# Patient Record
Sex: FEMALE | Race: WHITE | NOT HISPANIC OR LATINO | Employment: PART TIME | URBAN - METROPOLITAN AREA
[De-identification: names, ages, dates, MRNs, and addresses within clinical notes are randomized per-mention and may not be internally consistent; named-entity substitution may affect disease eponyms.]

---

## 2019-12-23 ENCOUNTER — TELEPHONE (OUTPATIENT)
Dept: OBSTETRICS AND GYNECOLOGY | Facility: CLINIC | Age: 29
End: 2019-12-23

## 2019-12-23 NOTE — TELEPHONE ENCOUNTER
----- Message from Zainab Velarde sent at 12/23/2019 10:31 AM CST -----  Contact: TAMI GRACIA  Who Called: TAMI GRACIA    Date of Positive Preg Test: 12/22/19 (5 test)    1st day of Last Menstrual Cycle: 11/20/2019 (4 days)    Preferred Physician: Unknown     List Any Difficulties: n/a     What Number to Call Back: [phone]    Can the clinic reply in [facility]: no

## 2019-12-28 ENCOUNTER — HOSPITAL ENCOUNTER (EMERGENCY)
Facility: OTHER | Age: 29
Discharge: HOME | End: 2019-12-28
Attending: EMERGENCY MEDICINE

## 2019-12-28 VITALS
WEIGHT: 173 LBS | RESPIRATION RATE: 16 BRPM | TEMPERATURE: 99 F | SYSTOLIC BLOOD PRESSURE: 119 MMHG | HEART RATE: 80 BPM | OXYGEN SATURATION: 100 % | HEIGHT: 63 IN | BODY MASS INDEX: 30.65 KG/M2 | DIASTOLIC BLOOD PRESSURE: 59 MMHG

## 2019-12-28 DIAGNOSIS — O36.80X0 PREGNANCY OF UNKNOWN ANATOMIC LOCATION: Primary | ICD-10-CM

## 2019-12-28 LAB
ABO + RH BLD: NORMAL
BASOPHILS # BLD AUTO: 0.05 K/UL (ref 0–0.2)
BASOPHILS NFR BLD: 0.4 % (ref 0–1.9)
BLD GP AB SCN CELLS X3 SERPL QL: NORMAL
DIFFERENTIAL METHOD: ABNORMAL
EOSINOPHIL # BLD AUTO: 0.1 K/UL (ref 0–0.5)
EOSINOPHIL NFR BLD: 1 % (ref 0–8)
ERYTHROCYTE [DISTWIDTH] IN BLOOD BY AUTOMATED COUNT: 12.8 % (ref 11.5–14.5)
HCT VFR BLD AUTO: 39.3 % (ref 37–48.5)
HGB BLD-MCNC: 13.2 G/DL (ref 12–16)
IMM GRANULOCYTES # BLD AUTO: 0.04 K/UL (ref 0–0.04)
IMM GRANULOCYTES NFR BLD AUTO: 0.3 % (ref 0–0.5)
LYMPHOCYTES # BLD AUTO: 3.4 K/UL (ref 1–4.8)
LYMPHOCYTES NFR BLD: 29.2 % (ref 18–48)
MCH RBC QN AUTO: 31.4 PG (ref 27–31)
MCHC RBC AUTO-ENTMCNC: 33.6 G/DL (ref 32–36)
MCV RBC AUTO: 93 FL (ref 82–98)
MONOCYTES # BLD AUTO: 0.8 K/UL (ref 0.3–1)
MONOCYTES NFR BLD: 6.7 % (ref 4–15)
NEUTROPHILS # BLD AUTO: 7.3 K/UL (ref 1.8–7.7)
NEUTROPHILS NFR BLD: 62.4 % (ref 38–73)
NRBC BLD-RTO: 0 /100 WBC
PLATELET # BLD AUTO: 277 K/UL (ref 150–350)
PMV BLD AUTO: 9.9 FL (ref 9.2–12.9)
RBC # BLD AUTO: 4.21 M/UL (ref 4–5.4)
WBC # BLD AUTO: 11.68 K/UL (ref 3.9–12.7)

## 2019-12-28 PROCEDURE — 63600175 PHARM REV CODE 636 W HCPCS: Performed by: EMERGENCY MEDICINE

## 2019-12-28 PROCEDURE — 99242 OFF/OP CONSLTJ NEW/EST SF 20: CPT | Mod: ,,, | Performed by: OBSTETRICS & GYNECOLOGY

## 2019-12-28 PROCEDURE — 86850 RBC ANTIBODY SCREEN: CPT

## 2019-12-28 PROCEDURE — 99242 PR OFFICE CONSULTATION,LEVEL II: ICD-10-PCS | Mod: ,,, | Performed by: OBSTETRICS & GYNECOLOGY

## 2019-12-28 PROCEDURE — 99284 EMERGENCY DEPT VISIT MOD MDM: CPT | Mod: 25

## 2019-12-28 PROCEDURE — 96365 THER/PROPH/DIAG IV INF INIT: CPT

## 2019-12-28 PROCEDURE — 85025 COMPLETE CBC W/AUTO DIFF WBC: CPT | Mod: 91

## 2019-12-28 RX ADMIN — PROMETHAZINE HYDROCHLORIDE 12.5 MG: 25 INJECTION INTRAMUSCULAR; INTRAVENOUS at 07:12

## 2019-12-29 NOTE — ASSESSMENT & PLAN NOTE
- Beta HCG - 629. Too early to definitively identify IUP per TVUS  - TVUS without identified IUP yet adnexal mass could represent ectopic pregnancy  - VSS  - No clinically significant lab abnormalities  - Patient appears clinically stable with reassuring physical exam  - Pregnancy of unknown viability. Will need B-HCG in 48 hours to assess viability of pregnancy  - Offered patient observation overnight but patient declined and adamantly desired discharge home  - Strict precautions given  - Patient placed in Beta Book  - Patient will have lab drawn and will be seen in Clinic on Monday for follow-up

## 2019-12-29 NOTE — SUBJECTIVE & OBJECTIVE
OB History   No data available     No past medical history on file.  No past surgical history on file.      (Not in a hospital admission)    Review of patient's allergies indicates:  No Known Allergies     Family History     None        Tobacco Use    Smoking status: Not on file   Substance and Sexual Activity    Alcohol use: Not on file    Drug use: Not on file    Sexual activity: Not on file     Review of Systems   Constitutional: Negative for chills and fever.   Eyes: Negative for visual disturbance.   Respiratory: Negative for shortness of breath.    Cardiovascular: Negative for chest pain.   Gastrointestinal: Negative for abdominal pain, diarrhea, nausea and vomiting.   Endocrine: Negative.    Genitourinary: Positive for vaginal bleeding. Negative for dysuria, hematuria and vaginal discharge.   Musculoskeletal: Negative for back pain.   Integumentary:  Negative for rash.   Neurological: Negative for headaches.   Psychiatric/Behavioral: Negative.       Objective:     Vital Signs (Most Recent):  Temp: 98.9 °F (37.2 °C) (12/28/19 1750)  Pulse: 85 (12/28/19 1750)  Resp: 16 (12/28/19 1750)  BP: (!) 140/91 (12/28/19 1750)  SpO2: 100 % (12/28/19 1750) Vital Signs (24h Range):  Temp:  [98.9 °F (37.2 °C)-99 °F (37.2 °C)] 98.9 °F (37.2 °C)  Pulse:  [73-85] 85  Resp:  [16-20] 16  SpO2:  [99 %-100 %] 100 %  BP: (122-140)/(44-91) 140/91     Weight: 78.5 kg (173 lb)  Body mass index is 31.14 kg/m².    No LMP recorded.    Physical Exam:   Constitutional: She is oriented to person, place, and time. She appears well-developed and well-nourished. No distress.    HENT:   Head: Normocephalic and atraumatic.    Eyes: Conjunctivae are normal.    Neck: Neck supple.    Cardiovascular: Normal rate, regular rhythm and intact distal pulses.     Pulmonary/Chest: Effort normal. No respiratory distress.        Abdominal: Soft. She exhibits no distension and no abdominal incision. There is no tenderness. There is no rebound and no  guarding.   Benign abdominal exam     Genitourinary: Vagina normal.   Genitourinary Comments: SSE - Evidence of old blood in the vagina with no active bleeding observed. Cervix closed.    SVE - No palpable adnexal masses. NO CMT.            Musculoskeletal: Normal range of motion and moves all extremeties.       Neurological: She is alert and oriented to person, place, and time.    Skin: Skin is warm and dry. She is not diaphoretic.    Psychiatric: She has a normal mood and affect. Her behavior is normal. Judgment and thought content normal.       Laboratory:    Beta HC    CBC:   Recent Labs   Lab 19  1804   WBC 11.68   RBC 4.21   HGB 13.2   HCT 39.3      MCV 93   MCH 31.4*   MCHC 33.6     CMP:   Recent Labs   Lab 19  1142   GLU 79   CALCIUM 8.8   ALBUMIN 4.2   PROT 7.5      K 3.7   CO2 24      BUN 11   CREATININE 0.5   ALKPHOS 54   ALT 13*   AST 21   BILITOT 0.8     I have personallly reviewed all pertinent lab results from the last 24 hours.    Diagnostic Results:  TVUS:     FINDINGS:  The uterus is midline and anteverted and measures 8.2 cm in greatest longitudinal dimension.    The endometrium measures 2 cm in greatest thickness.  There is a 0.17 cm cystic abnormality in the endometrium in the midbody of the uterus.    The myometrium is homogeneous in echogenicity.    The left ovary measures 2.98 x 1.35 x 2.31 cm in greatest dimension and there is a 1.4 cm cyst involving the left ovary.  There is normal color flow in the left ovary.    The right ovary measures 3.94 x 2.77 x 2.80 cm in greatest dimension.  There is a hypoechoic abnormality in the right ovary measuring 2.2 x 1.8 x 2.0 cm in greatest dimension with increased color flow surrounding this mass and a small ring-like structure measuring 0.49 cm in greatest dimension associated with this mass.    There is a mild amount of free fluid in the pelvis.      Impression       Right adnexal mass measuring 2.2 cm in greatest  dimension with increased color flow surrounding the mass and a ring-like structure associated with the mass.  This could represent an ectopic pregnancy.    Thickening of the endometrium.    Mild amount of free fluid in the pelvis.    A preliminary report of this examination was provided by virtual Radiology at the time of the performance of the study.

## 2019-12-29 NOTE — HPI
Ms. Adams is a 29-year-old female who presented to the ED at [facility] with complaints of vaginal bleeding.  According to the patient she has recently had positive pregnancy tests and would be dated at 5 weeks and 3 days based off of a last menstrual period.  Of note, the patient states that her periods are regular.  The patient states that yesterday morning she noted what she describes as a pink discharge coming from the vagina.  Her symptoms persisted throughout the course of the day and into the evening.  She states that she awoke this morning to find a small amount of brighter red blood and decided to present to the ED.  In the ED the patient's vital signs were found to be stable in her beta HCG was roughly 600.  A transvaginal ultrasound failed to identify an intrauterine pregnancy and was significant for a right adnexal mass.  Given concern for an ectopic pregnancy the patient was transported to [facility] for further evaluation by an OBGYN.  Upon arrival at [facility] the patient confirms the above story inc complains of a subtle increase in her vaginal bleeding since receiving her transvaginal ultrasound.  Otherwise, the patient denies any other significant complaints.    Of note this is a desired pregnancy.  The patient denies any risk factors for ectopic pregnancy including prior pelvic infections or abdominal surgeries.  The patient has not seen an OBGYN but has plans to establish care in the coming weeks.

## 2019-12-29 NOTE — CONSULTS
[facility]  Obstetrics & Gynecology  Consult Note    Patient Name: Bonnie Adams  MRN: [id number]  Admission Date: 12/28/2019  Hospital Length of Stay: 0 days  Code Status: No Order  Primary Care Provider: Primary Doctor No  Principal Problem: Pregnancy of unknown anatomic location    Consults  Subjective:     Chief Complaint: Vaginal Bleeding    History of Present Illness:  Ms. Adams is a 29-year-old female who presented to the ED at [facility] with complaints of vaginal bleeding.  According to the patient she has recently had positive pregnancy tests and would be dated at 5 weeks and 3 days based off of a last menstrual period.  Of note, the patient states that her periods are regular.  The patient states that yesterday morning she noted what she describes as a pink discharge coming from the vagina.  Her symptoms persisted throughout the course of the day and into the evening.  She states that she awoke this morning to find a small amount of brighter red blood and decided to present to the ED.  In the ED the patient's vital signs were found to be stable in her beta HCG was roughly 600.  A transvaginal ultrasound failed to identify an intrauterine pregnancy and was significant for a right adnexal mass.  Given concern for an ectopic pregnancy the patient was transported to [facility] for further evaluation by an OBGYN.  Upon arrival at [facility] the patient confirms the above story inc complains of a subtle increase in her vaginal bleeding since receiving her transvaginal ultrasound.  Otherwise, the patient denies any other significant complaints.    Of note this is a desired pregnancy.  The patient denies any risk factors for ectopic pregnancy including prior pelvic infections or abdominal surgeries.  The patient has not seen an OBGYN but has plans to establish care in the coming weeks.        OB History   No data available     No past medical history on file.  No  past surgical history on file.      (Not in a hospital admission)    Review of patient's allergies indicates:  No Known Allergies     Family History     None        Tobacco Use    Smoking status: Not on file   Substance and Sexual Activity    Alcohol use: Not on file    Drug use: Not on file    Sexual activity: Not on file     Review of Systems   Constitutional: Negative for chills and fever.   Eyes: Negative for visual disturbance.   Respiratory: Negative for shortness of breath.    Cardiovascular: Negative for chest pain.   Gastrointestinal: Negative for abdominal pain, diarrhea, nausea and vomiting.   Endocrine: Negative.    Genitourinary: Positive for vaginal bleeding. Negative for dysuria, hematuria and vaginal discharge.   Musculoskeletal: Negative for back pain.   Integumentary:  Negative for rash.   Neurological: Negative for headaches.   Psychiatric/Behavioral: Negative.       Objective:     Vital Signs (Most Recent):  Temp: 98.9 °F (37.2 °C) (12/28/19 1750)  Pulse: 85 (12/28/19 1750)  Resp: 16 (12/28/19 1750)  BP: (!) 140/91 (12/28/19 1750)  SpO2: 100 % (12/28/19 1750) Vital Signs (24h Range):  Temp:  [98.9 °F (37.2 °C)-99 °F (37.2 °C)] 98.9 °F (37.2 °C)  Pulse:  [73-85] 85  Resp:  [16-20] 16  SpO2:  [99 %-100 %] 100 %  BP: (122-140)/(44-91) 140/91     Weight: 78.5 kg (173 lb)  Body mass index is 31.14 kg/m².    No LMP recorded.    Physical Exam:   Constitutional: She is oriented to person, place, and time. She appears well-developed and well-nourished. No distress.    HENT:   Head: Normocephalic and atraumatic.    Eyes: Conjunctivae are normal.    Neck: Neck supple.    Cardiovascular: Normal rate, regular rhythm and intact distal pulses.     Pulmonary/Chest: Effort normal. No respiratory distress.        Abdominal: Soft. She exhibits no distension and no abdominal incision. There is no tenderness. There is no rebound and no guarding.   Benign abdominal exam     Genitourinary: Vagina normal.    Genitourinary Comments: SSE - Evidence of old blood in the vagina with no active bleeding observed. Cervix closed.    SVE - No palpable adnexal masses. NO CMT.            Musculoskeletal: Normal range of motion and moves all extremeties.       Neurological: She is alert and oriented to person, place, and time.    Skin: Skin is warm and dry. She is not diaphoretic.    Psychiatric: She has a normal mood and affect. Her behavior is normal. Judgment and thought content normal.       Laboratory:    Beta HC    CBC:   Recent Labs   Lab 19  1804   WBC 11.68   RBC 4.21   HGB 13.2   HCT 39.3      MCV 93   MCH 31.4*   MCHC 33.6     CMP:   Recent Labs   Lab 19  1142   GLU 79   CALCIUM 8.8   ALBUMIN 4.2   PROT 7.5      K 3.7   CO2 24      BUN 11   CREATININE 0.5   ALKPHOS 54   ALT 13*   AST 21   BILITOT 0.8     I have personallly reviewed all pertinent lab results from the last 24 hours.    Diagnostic Results:  TVUS:     FINDINGS:  The uterus is midline and anteverted and measures 8.2 cm in greatest longitudinal dimension.    The endometrium measures 2 cm in greatest thickness.  There is a 0.17 cm cystic abnormality in the endometrium in the midbody of the uterus.    The myometrium is homogeneous in echogenicity.    The left ovary measures 2.98 x 1.35 x 2.31 cm in greatest dimension and there is a 1.4 cm cyst involving the left ovary.  There is normal color flow in the left ovary.    The right ovary measures 3.94 x 2.77 x 2.80 cm in greatest dimension.  There is a hypoechoic abnormality in the right ovary measuring 2.2 x 1.8 x 2.0 cm in greatest dimension with increased color flow surrounding this mass and a small ring-like structure measuring 0.49 cm in greatest dimension associated with this mass.    There is a mild amount of free fluid in the pelvis.      Impression       Right adnexal mass measuring 2.2 cm in greatest dimension with increased color flow surrounding the mass and a  ring-like structure associated with the mass.  This could represent an ectopic pregnancy.    Thickening of the endometrium.    Mild amount of free fluid in the pelvis.    A preliminary report of this examination was provided by virtual Radiology at the time of the performance of the study.         Assessment/Plan:     * Pregnancy of unknown anatomic location  - Beta HCG - 629. Too early to definitively identify IUP per TVUS  - TVUS without identified IUP yet adnexal mass could represent ectopic pregnancy  - VSS  - No clinically significant lab abnormalities  - Patient appears clinically stable with reassuring physical exam  - Pregnancy of unknown viability. Will need B-HCG in 48 hours to assess viability of pregnancy  - Offered patient observation overnight but patient declined and adamantly desired discharge home  - Strict precautions given  - Patient placed in Beta Book  - Patient will have lab drawn and will be seen in Clinic on Monday for follow-up      Thank you for your consult. I will sign off. Please contact us if you have any additional questions.    Kalina Mcallister MD  Obstetrics & Gynecology  [facility]    I had lengthy conversation with patient and her partner. Given pregnancy of unknown location, vaginal bleeding and US findings - I recommended admission to hospital for observation and serial exams. She declines. I spoke with radiology regarding US report and US is equivocal. At this point, this is a much desired pregnancy and she strongly desires expectant management. Abdominal exam is benign and bleeding has slowed down. She needs serial labs and repeat US. Strict precautions reviewed for her to return to ED immediately with any abdominal pain, increase in bleeding. I would recommend that she not go to work tomorrow and have someone stay with her at the house. She voices understanding. HCG on Monday and she will see someone in our office in clinic.     Deborah Rosales,  MD  Obstetrics and Gynecology  [facility]

## 2019-12-29 NOTE — ED PROVIDER NOTES
Encounter Date: 2019       History     Chief Complaint   Patient presents with    transfer     ED to ED transfer from [facility] for OBGYN evaluation for ectopic pregnancy, accepting OB MD Rosales.      29-year-old female  approximately 5 weeks pregnant by dates presents emergency department as a transfer from the [facility] Emergency Department.  The patient was seen today due to light vaginal spotting earlier that has progressed to now vaginal bleeding approximately the flow of a normal menstrual period.  The patient denies any urinary symptoms.  She reports that she has pain along the right side of her abdomen that radiates to her back at this time.  She denies any fever or chills.  She has been hemodynamically stable. She is Rh positive.  Her ultrasound showed a mass in the right adnexa concerning for ectopic pregnancy.     The history is provided by the patient.     Review of patient's allergies indicates:  No Known Allergies  No past medical history on file.  No past surgical history on file.  No family history on file.  Social History     Tobacco Use    Smoking status: Not on file   Substance Use Topics    Alcohol use: Not on file    Drug use: Not on file     Review of Systems   Constitutional: Negative for fever.   HENT: Negative for sore throat.    Respiratory: Negative for shortness of breath.    Cardiovascular: Negative for chest pain.   Gastrointestinal: Positive for abdominal pain. Negative for nausea.   Genitourinary: Positive for vaginal bleeding. Negative for dysuria.   Musculoskeletal: Negative for back pain.   Skin: Negative for color change, rash and wound.   Neurological: Negative for weakness.   Hematological: Does not bruise/bleed easily.   All other systems reviewed and are negative.      Physical Exam     Initial Vitals [19 1750]   BP Pulse Resp Temp SpO2   (!) 140/91 85 16 98.9 °F (37.2 °C) 100 %      MAP       --         Physical Exam    Nursing note  and vitals reviewed.  Constitutional: She appears well-developed and well-nourished. No distress.   Patient is tearful   HENT:   Head: Normocephalic and atraumatic.   Right Ear: External ear normal.   Left Ear: External ear normal.   Eyes: Conjunctivae are normal. Right eye exhibits no discharge. Left eye exhibits no discharge. No scleral icterus.   Cardiovascular: Normal rate, regular rhythm and normal heart sounds. Exam reveals no gallop and no friction rub.    No murmur heard.  Pulmonary/Chest: Breath sounds normal. No stridor. No respiratory distress. She has no wheezes. She has no rhonchi. She has no rales.   Abdominal: Soft. Bowel sounds are normal. She exhibits no distension and no mass. There is no tenderness. There is no rebound and no guarding.   Musculoskeletal: She exhibits no edema.   Neurological: She is alert and oriented to person, place, and time. She has normal strength. No cranial nerve deficit or sensory deficit. GCS score is 15. GCS eye subscore is 4. GCS verbal subscore is 5. GCS motor subscore is 6.   Skin: Skin is warm and dry. Capillary refill takes less than 2 seconds.   Psychiatric: She has a normal mood and affect. Her behavior is normal. Judgment and thought content normal.         ED Course   Procedures  Labs Reviewed   CBC W/ AUTO DIFFERENTIAL - Abnormal; Notable for the following components:       Result Value    Mean Corpuscular Hemoglobin 31.4 (*)     All other components within normal limits   TYPE & SCREEN          Imaging Results    None          Medical Decision Making:   Initial Assessment:   29-year-old female transferred from [facility] for suspected ectopic pregnancy.  Transferred for ob/gyn services.  Contacted OB team on arrival, will eval patient in the ED.   Differential Diagnosis:   Ectopic pregnancy, threatened , spontaneous , implantation bleeding, vaginitis, UTI, rectal bleeding    Clinical Tests:   Lab Tests: Ordered and Reviewed  ED  Management:  This patient was discussed with Dr. Woodward at shift change including presentation, testing thus far and pending testing and treatment which includes OB/GYN eval.  Anticipated disposition is home.                                   Clinical Impression:       ICD-10-CM ICD-9-CM   1. Pregnancy of unknown anatomic location O36.80X0 V23.87                             Laura Bejarano MD  12/30/19 1008

## 2019-12-29 NOTE — HOSPITAL COURSE
12/28/2019 - patient transferred to [facility] from [facility] for evaluation of possible ectopic pregnancy.

## 2019-12-29 NOTE — DISCHARGE INSTRUCTIONS
Follow up with [facility] on Monday for repeat labs and OB office will get in touch with you after.

## 2019-12-30 ENCOUNTER — OFFICE VISIT (OUTPATIENT)
Dept: OBSTETRICS AND GYNECOLOGY | Facility: CLINIC | Age: 29
End: 2019-12-30

## 2019-12-30 ENCOUNTER — INITIAL PRENATAL (OUTPATIENT)
Dept: OBSTETRICS AND GYNECOLOGY | Facility: CLINIC | Age: 29
End: 2019-12-30

## 2019-12-30 ENCOUNTER — LAB VISIT (OUTPATIENT)
Dept: LAB | Facility: HOSPITAL | Age: 29
End: 2019-12-30
Attending: OBSTETRICS & GYNECOLOGY

## 2019-12-30 ENCOUNTER — TELEPHONE (OUTPATIENT)
Dept: OBSTETRICS AND GYNECOLOGY | Facility: CLINIC | Age: 29
End: 2019-12-30

## 2019-12-30 VITALS
BODY MASS INDEX: 30.74 KG/M2 | HEIGHT: 63 IN | DIASTOLIC BLOOD PRESSURE: 62 MMHG | WEIGHT: 173.5 LBS | SYSTOLIC BLOOD PRESSURE: 130 MMHG

## 2019-12-30 VITALS
WEIGHT: 173.5 LBS | HEIGHT: 63 IN | DIASTOLIC BLOOD PRESSURE: 62 MMHG | SYSTOLIC BLOOD PRESSURE: 130 MMHG | BODY MASS INDEX: 30.74 KG/M2

## 2019-12-30 DIAGNOSIS — O36.80X0 PREGNANCY OF UNKNOWN ANATOMIC LOCATION: Primary | ICD-10-CM

## 2019-12-30 DIAGNOSIS — O36.80X0 PREGNANCY OF UNKNOWN ANATOMIC LOCATION: ICD-10-CM

## 2019-12-30 LAB
C TRACH DNA SPEC QL NAA+PROBE: NOT DETECTED
HCG INTACT+B SERPL-ACNC: 115 MIU/ML
N GONORRHOEA DNA SPEC QL NAA+PROBE: NOT DETECTED
PROGEST SERPL-MCNC: 3.4 NG/ML

## 2019-12-30 PROCEDURE — 99499 NO LOS: ICD-10-PCS | Mod: S$PBB,,, | Performed by: NURSE PRACTITIONER

## 2019-12-30 PROCEDURE — 84144 ASSAY OF PROGESTERONE: CPT

## 2019-12-30 PROCEDURE — 99213 PR OFFICE/OUTPT VISIT, EST, LEVL III, 20-29 MIN: ICD-10-PCS | Mod: S$PBB,,, | Performed by: NURSE PRACTITIONER

## 2019-12-30 PROCEDURE — 87661 TRICHOMONAS VAGINALIS AMPLIF: CPT

## 2019-12-30 PROCEDURE — 36415 COLL VENOUS BLD VENIPUNCTURE: CPT | Mod: PN

## 2019-12-30 PROCEDURE — 99213 OFFICE O/P EST LOW 20 MIN: CPT | Mod: S$PBB,,, | Performed by: NURSE PRACTITIONER

## 2019-12-30 PROCEDURE — 99999 PR PBB SHADOW E&M-EST. PATIENT-LVL III: ICD-10-PCS | Mod: PBBFAC,,, | Performed by: NURSE PRACTITIONER

## 2019-12-30 PROCEDURE — 87491 CHLMYD TRACH DNA AMP PROBE: CPT

## 2019-12-30 PROCEDURE — 87481 CANDIDA DNA AMP PROBE: CPT | Mod: 59

## 2019-12-30 PROCEDURE — 99999 PR PBB SHADOW E&M-EST. PATIENT-LVL III: CPT | Mod: PBBFAC,,, | Performed by: NURSE PRACTITIONER

## 2019-12-30 PROCEDURE — 99499 UNLISTED E&M SERVICE: CPT | Mod: S$PBB,,, | Performed by: NURSE PRACTITIONER

## 2019-12-30 PROCEDURE — 99213 OFFICE O/P EST LOW 20 MIN: CPT | Mod: PBBFAC,PN | Performed by: NURSE PRACTITIONER

## 2019-12-30 PROCEDURE — 84702 CHORIONIC GONADOTROPIN TEST: CPT

## 2019-12-30 NOTE — TELEPHONE ENCOUNTER
----- Message from Deborah Rosales MD sent at 12/28/2019  8:27 PM CST -----  Hi,  This patient needs to be seen on Monday by someone. I am not sure who has an available slot open. I am post call. She has a pregnancy of unknown location and was seen in the emergency room. Can we add her on to a providers schedule for Monday?  Thanks  Deborah

## 2019-12-30 NOTE — LETTER
December 30, 2019      Deborah Rosales MD  [address] [address]           [facility] - Obstetrics and Gynecology  [address] [facility] [address]  Phone: [phone]  Fax: [phone]          Patient: Bonnie Adams   MR Number: [id number]   YOB: 1990   Date of Visit: 12/30/2019       Dear Dr. Deborah Rosales:    Thank you for referring Bonnie Adams to me for evaluation. Attached you will find relevant portions of my assessment and plan of care.    If you have questions, please do not hesitate to call me. I look forward to following Bonnie Adams along with you.    Sincerely,    Racquel Womack, KRISSY    Enclosure  CC:  No Recipients    If you would like to receive this communication electronically, please contact [email or web address] or [phone] to request more information on [facility] [employer] access.    For providers and/or their staff who would like to refer a patient to [facility], please contact us through our one-stop-shop provider referral line, [facility], at [phone].    If you feel you have received this communication in error or would no longer like to receive these types of communications, please e-mail [email or web address]

## 2019-12-31 ENCOUNTER — TELEPHONE (OUTPATIENT)
Dept: OBSTETRICS AND GYNECOLOGY | Facility: CLINIC | Age: 29
End: 2019-12-31

## 2019-12-31 DIAGNOSIS — O03.9 SPONTANEOUS PREGNANCY LOSS: Primary | ICD-10-CM

## 2019-12-31 LAB
BACTERIAL VAGINOSIS DNA: POSITIVE
CANDIDA GLABRATA DNA: NEGATIVE
CANDIDA KRUSEI DNA: NEGATIVE
CANDIDA RRNA VAG QL PROBE: NEGATIVE
T VAGINALIS RRNA GENITAL QL PROBE: POSITIVE

## 2019-12-31 NOTE — TELEPHONE ENCOUNTER
Tried calling Bonnie on mobile number to review HCG results. No answer, unable to leave .    Deborah Rosales MD  Obstetrics and Gynecology  [facility]

## 2019-12-31 NOTE — TELEPHONE ENCOUNTER
Was able to reach patient. Reviewed HCG results. Suspect chemical pregnancy vs SAB given decline in HCG and bleeding. Recommend following down to normal given PUL. Use condoms or abstinence until then. Repeat HCG in 1 week.    Deborah Rosales MD  Obstetrics and Gynecology  [facility]

## 2019-12-31 NOTE — PROGRESS NOTES
History & Physical  Gynecology      SUBJECTIVE:     Chief Complaint: Follow-up (ed visit)       History of Present Illness:  Pt presents for follow-up from ER visit on 19 for possible right ectopic pregnancy. Pt presented to the ER in [place] on 19 for +UPT, vaginal bleeding, and right sided pelvic pain. Pt was transferred to [facility] ER. US report is as follows:     FINDINGS:  The uterus is midline and anteverted and measures 8.2 cm in greatest longitudinal dimension.    The endometrium measures 2 cm in greatest thickness.  There is a 0.17 cm cystic abnormality in the endometrium in the midbody of the uterus.    The myometrium is homogeneous in echogenicity.    The left ovary measures 2.98 x 1.35 x 2.31 cm in greatest dimension and there is a 1.4 cm cyst involving the left ovary.  There is normal color flow in the left ovary.    The right ovary measures 3.94 x 2.77 x 2.80 cm in greatest dimension.  There is a hypoechoic abnormality in the right ovary measuring 2.2 x 1.8 x 2.0 cm in greatest dimension with increased color flow surrounding this mass and a small ring-like structure measuring 0.49 cm in greatest dimension associated with this mass.    There is a mild amount of free fluid in the pelvis.      Impression       Right adnexal mass measuring 2.2 cm in greatest dimension with increased color flow surrounding the mass and a ring-like structure associated with the mass.  This could represent an ectopic pregnancy.    Thickening of the endometrium.    Mild amount of free fluid in the pelvis.         Initial bhcg 629, Rh +    Pt reports she is no longer bleeding or having any pelvic pain      Review of patient's allergies indicates:  No Known Allergies    Past Medical History:   Diagnosis Date    Scoliosis      History reviewed. No pertinent surgical history.  OB History        2    Para        Term                AB        Living           SAB        TAB        Ectopic         Multiple        Live Births                   Family History   Problem Relation Age of Onset    Breast cancer Neg Hx     Colon cancer Neg Hx     Ovarian cancer Neg Hx      Social History     Tobacco Use    Smoking status: Not on file   Substance Use Topics    Alcohol use: Not on file    Drug use: Not on file       Current Outpatient Medications   Medication Sig    prenatal vit/iron fum/folic ac (PRENATAL 1+1 ORAL) Take by mouth.     No current facility-administered medications for this visit.      ROS:  GENERAL: Denies weight gain or weight loss. Feeling well overall.   SKIN: Denies rash or lesions.   HEAD: Denies head injury or headache.   NODES: Denies enlarged lymph nodes.   CHEST: Denies chest pain or shortness of breath.   CARDIOVASCULAR: Denies palpitations or left sided chest pain.   ABDOMEN: No abdominal pain, constipation, diarrhea, nausea, vomiting or rectal bleeding.   URINARY: No frequency, dysuria, hematuria, or burning on urination.  REPRODUCTIVE: See HPI.   BREASTS: The patient performs breast self-examination and denies pain, lumps, or nipple discharge.   HEMATOLOGIC: No easy bruisability or excessive bleeding.  MUSCULOSKELETAL: Denies joint pain or swelling.   NEUROLOGIC: Denies syncope or weakness.   PSYCHIATRIC: Denies depression, anxiety or mood swings.    Physical Exam:  APPEARANCE: Well nourished, well developed, in no acute distress.  AFFECT: WNL, alert and oriented x 3  SKIN: No acne or hirsutism  NECK: Neck symmetric without masses or thyromegaly  NODES: No inguinal, cervical, axillary, or femoral lymph node enlargement  CHEST: Good respiratory effect  ABDOMEN: Soft.  No tenderness or masses.  No hepatosplenomegaly.  No hernias.  BREASTS: Symmetrical, no skin changes or visible lesions.  No palpable masses, nipple discharge bilaterally.  PELVIC: Normal external genitalia without lesions.  Normal hair distribution.  Adequate perineal body, normal urethral meatus.  Vagina moist and well  rugated without lesions or discharge.  Cervix pink, without lesions, discharge or tenderness.  No significant cystocele or rectocele.  Bimanual exam shows uterus to be normal size, regular, mobile and nontender.  Adnexa without masses or tenderness.    EXTREMITIES: No edema.      ASSESSMENT:       ICD-10-CM ICD-9-CM    1. Pregnancy of unknown anatomic location O36.80X0 V23.87           Plan:      Discussed case with Dr. Watkins. As pt is no longer having any pain or bleeding and first bhcg was only 629, will repeat bhcg with progesterone today and repeat US in 1 week. Strict ectopic precautions discussed. Discussed with pt this could be an early intrauterine pregnancy, an ectopic pregnancy, or a spontaneous AB. All questions answered.     GCCT, affirm done today as they were not previously done.     F/u 1 week    Racquel Womack

## 2020-01-01 ENCOUNTER — HOSPITAL ENCOUNTER (EMERGENCY)
Facility: OTHER | Age: 30
Discharge: HOME | End: 2020-01-02
Attending: EMERGENCY MEDICINE

## 2020-01-01 DIAGNOSIS — O03.9 COMPLETE MISCARRIAGE: Primary | ICD-10-CM

## 2020-01-01 LAB
BASOPHILS # BLD AUTO: 0.03 K/UL (ref 0–0.2)
BASOPHILS NFR BLD: 0.2 % (ref 0–1.9)
DIFFERENTIAL METHOD: ABNORMAL
EOSINOPHIL # BLD AUTO: 0.1 K/UL (ref 0–0.5)
EOSINOPHIL NFR BLD: 1.1 % (ref 0–8)
ERYTHROCYTE [DISTWIDTH] IN BLOOD BY AUTOMATED COUNT: 12.8 % (ref 11.5–14.5)
EXTRA BLUE TOP RAINBOW: NORMAL
EXTRA GOLD TOP RAINBOW: NORMAL
EXTRA LAVENDER TOP RAINBOW: NORMAL
EXTRA RED TOP RAINBOW: NORMAL
HCT VFR BLD AUTO: 36 % (ref 37–48.5)
HGB BLD-MCNC: 11.9 G/DL (ref 12–16)
IMM GRANULOCYTES # BLD AUTO: 0.06 K/UL (ref 0–0.04)
IMM GRANULOCYTES NFR BLD AUTO: 0.5 % (ref 0–0.5)
LYMPHOCYTES # BLD AUTO: 2 K/UL (ref 1–4.8)
LYMPHOCYTES NFR BLD: 16.9 % (ref 18–48)
MCH RBC QN AUTO: 30.8 PG (ref 27–31)
MCHC RBC AUTO-ENTMCNC: 33.1 G/DL (ref 32–36)
MCV RBC AUTO: 93 FL (ref 82–98)
MONOCYTES # BLD AUTO: 0.9 K/UL (ref 0.3–1)
MONOCYTES NFR BLD: 7.3 % (ref 4–15)
NEUTROPHILS # BLD AUTO: 8.9 K/UL (ref 1.8–7.7)
NEUTROPHILS NFR BLD: 74 % (ref 38–73)
NRBC BLD-RTO: 0 /100 WBC
PLATELET # BLD AUTO: 224 K/UL (ref 150–350)
PMV BLD AUTO: 9.7 FL (ref 9.2–12.9)
RBC # BLD AUTO: 3.86 M/UL (ref 4–5.4)
WBC # BLD AUTO: 12.05 K/UL (ref 3.9–12.7)

## 2020-01-01 PROCEDURE — 63600175 PHARM REV CODE 636 W HCPCS: Performed by: EMERGENCY MEDICINE

## 2020-01-01 PROCEDURE — 85025 COMPLETE CBC W/AUTO DIFF WBC: CPT

## 2020-01-01 PROCEDURE — 96361 HYDRATE IV INFUSION ADD-ON: CPT

## 2020-01-01 PROCEDURE — 86900 BLOOD TYPING SEROLOGIC ABO: CPT

## 2020-01-01 PROCEDURE — 99284 EMERGENCY DEPT VISIT MOD MDM: CPT | Mod: 25

## 2020-01-01 PROCEDURE — 80053 COMPREHEN METABOLIC PANEL: CPT

## 2020-01-01 PROCEDURE — 84702 CHORIONIC GONADOTROPIN TEST: CPT

## 2020-01-01 PROCEDURE — 96360 HYDRATION IV INFUSION INIT: CPT

## 2020-01-01 RX ORDER — SODIUM CHLORIDE 9 MG/ML
125 INJECTION, SOLUTION INTRAVENOUS CONTINUOUS
Status: DISCONTINUED | OUTPATIENT
Start: 2020-01-01 | End: 2020-01-02

## 2020-01-01 RX ADMIN — SODIUM CHLORIDE 1000 ML: 0.9 INJECTION, SOLUTION INTRAVENOUS at 11:01

## 2020-01-02 VITALS
HEIGHT: 62 IN | BODY MASS INDEX: 31.83 KG/M2 | WEIGHT: 173 LBS | SYSTOLIC BLOOD PRESSURE: 103 MMHG | HEART RATE: 70 BPM | OXYGEN SATURATION: 100 % | TEMPERATURE: 98 F | DIASTOLIC BLOOD PRESSURE: 61 MMHG | RESPIRATION RATE: 18 BRPM

## 2020-01-02 DIAGNOSIS — B96.89 BACTERIAL VAGINOSIS: Primary | ICD-10-CM

## 2020-01-02 DIAGNOSIS — A59.9 TRICHIMONIASIS: ICD-10-CM

## 2020-01-02 DIAGNOSIS — N76.0 BACTERIAL VAGINOSIS: Primary | ICD-10-CM

## 2020-01-02 LAB
ABO + RH BLD: NORMAL
ALBUMIN SERPL BCP-MCNC: 3.7 G/DL (ref 3.5–5.2)
ALP SERPL-CCNC: 68 U/L (ref 55–135)
ALT SERPL W/O P-5'-P-CCNC: 24 U/L (ref 10–44)
ANION GAP SERPL CALC-SCNC: 9 MMOL/L (ref 8–16)
AST SERPL-CCNC: 27 U/L (ref 10–40)
BILIRUB SERPL-MCNC: 0.1 MG/DL (ref 0.1–1)
BLD GP AB SCN CELLS X3 SERPL QL: NORMAL
BUN SERPL-MCNC: 8 MG/DL (ref 6–20)
CALCIUM SERPL-MCNC: 8.5 MG/DL (ref 8.7–10.5)
CHLORIDE SERPL-SCNC: 110 MMOL/L (ref 95–110)
CO2 SERPL-SCNC: 21 MMOL/L (ref 23–29)
CREAT SERPL-MCNC: 0.6 MG/DL (ref 0.5–1.4)
EST. GFR  (AFRICAN AMERICAN): >60 ML/MIN/1.73 M^2
EST. GFR  (NON AFRICAN AMERICAN): >60 ML/MIN/1.73 M^2
GLUCOSE SERPL-MCNC: 93 MG/DL (ref 70–110)
HCG INTACT+B SERPL-ACNC: 26 MIU/ML
POTASSIUM SERPL-SCNC: 3.5 MMOL/L (ref 3.5–5.1)
PROT SERPL-MCNC: 6.5 G/DL (ref 6–8.4)
SODIUM SERPL-SCNC: 140 MMOL/L (ref 136–145)

## 2020-01-02 RX ORDER — ONDANSETRON 4 MG/1
4 TABLET, ORALLY DISINTEGRATING ORAL EVERY 6 HOURS PRN
Qty: 20 TABLET | Refills: 0 | Status: SHIPPED | OUTPATIENT
Start: 2020-01-02 | End: 2021-11-04 | Stop reason: CLARIF

## 2020-01-02 RX ORDER — ETODOLAC 400 MG/1
400 TABLET, FILM COATED ORAL 2 TIMES DAILY PRN
Qty: 20 TABLET | Refills: 0 | Status: SHIPPED | OUTPATIENT
Start: 2020-01-02 | End: 2021-11-04 | Stop reason: CLARIF

## 2020-01-02 RX ORDER — METRONIDAZOLE 500 MG/1
500 TABLET ORAL 2 TIMES DAILY
Qty: 14 TABLET | Refills: 0 | Status: SHIPPED | OUTPATIENT
Start: 2020-01-02 | End: 2020-01-09

## 2020-01-02 RX ORDER — PROMETHAZINE HYDROCHLORIDE 25 MG/1
25 TABLET ORAL EVERY 6 HOURS PRN
Qty: 25 TABLET | Refills: 0 | Status: SHIPPED | OUTPATIENT
Start: 2020-01-02 | End: 2021-11-04 | Stop reason: CLARIF

## 2020-01-02 NOTE — ED TRIAGE NOTES
"Pt states she was sent to [facility] from [place] for "tubal pregnancy" on 12/28/19.  Was then told she had a chemical pregnancy.  Pt states she was having vaginal bleeding that increased after having an ultrasound last Saturday, but it slowly lessened and then stopped.  Pt states she started having heavy bleeding and cramping again tonight at around 5:30 pm.  Reports she started feeling lightheaded and dizzy.  Passed a blood clot while using the restroom.  +suprapubic pain.  aao x 4.  Reports this is her first pregnancy.  Pt arrived by EMS and was given 100 mcg Fentanyl en route.  "

## 2020-01-02 NOTE — ED PROVIDER NOTES
"Encounter Date: 1/1/2020    SCRIBE #1 NOTE: I, Surekha Martinez, am scribing for, and in the presence of, Dr. Leonardo.       History     Chief Complaint   Patient presents with    Vaginal Bleeding     and pelvic pain, recent miscarriage      Time seen by provider: 11:24 PM    This is a 29 y.o. female who presents via EMS with complaint of vaginal bleeding with associated pelvic pain and abdominal pain like "contractions" since 5:30 PM today. She was evaluated for vagina bleeding last week and believed to have an ectopic pregnancy. She has been followed in OBGYN clinic since then. She reports vaginal bleeding stopped 3 days ago, then started again today. She was seen 2 days ago for evaluation and labs which resulted yesterday showing HCG level of 115. She states that she generally weak right before she passed a blood clot this evening, all of which resolved afterwards. She still felt pelvic pain after passing clot. She has gone through one panty-liner since bleeding recurred today. She states that she has been using the restroom often to wipe away blood, but does not feel as though it would run down her leg. She denies any current pain as she was administered Fentanyl by EMS. This was her first pregnancy. She did not undergo any fertility treatments for this pregnancy. Her LMP was November 24. She has no other complaints at the time.    The history is provided by the patient and the spouse.     Review of patient's allergies indicates:  No Known Allergies  Past Medical History:   Diagnosis Date    Scoliosis      History reviewed. No pertinent surgical history.  Family History   Problem Relation Age of Onset    Breast cancer Neg Hx     Colon cancer Neg Hx     Ovarian cancer Neg Hx      Social History     Tobacco Use    Smoking status: Current Every Day Smoker     Packs/day: 0.50     Types: Cigarettes   Substance Use Topics    Alcohol use: Yes     Comment: sometimes    Drug use: Never     Review of Systems " "  Constitutional: Negative for chills and fever.   HENT:        Positive for hearing change (resolved).   Respiratory: Negative for chest tightness and shortness of breath.    Cardiovascular: Negative for chest pain.   Gastrointestinal: Positive for abdominal pain (like "contractions").   Genitourinary: Positive for pelvic pain and vaginal bleeding.   Neurological: Positive for dizziness (resolved) and weakness (generalized, resolved).   All other systems reviewed and are negative.      Physical Exam     Initial Vitals [01/01/20 2254]   BP Pulse Resp Temp SpO2   (!) 98/59 80 18 98.3 °F (36.8 °C) 99 %      MAP       --         Physical Exam    Nursing note and vitals reviewed.  Constitutional: She appears well-developed and well-nourished. She is not diaphoretic. No distress.   HENT:   Head: Normocephalic and atraumatic.   Right Ear: External ear normal.   Left Ear: External ear normal.   Nose: Nose normal.   Eyes: Conjunctivae and EOM are normal. Pupils are equal, round, and reactive to light.   Neck: Normal range of motion. Neck supple. No tracheal deviation present. No JVD present.   Cardiovascular: Normal rate, regular rhythm, normal heart sounds and intact distal pulses. Exam reveals no gallop and no friction rub.    No murmur heard.  Pulmonary/Chest: Breath sounds normal. No respiratory distress. She has no wheezes. She has no rhonchi. She has no rales. She exhibits no tenderness.   Abdominal: Soft. Bowel sounds are normal. She exhibits no distension and no mass. There is no tenderness. There is no rebound and no guarding.   Musculoskeletal: Normal range of motion. She exhibits no edema or tenderness.   Neurological: She is alert and oriented to person, place, and time. She has normal strength.   Skin: Skin is warm and dry. Capillary refill takes less than 2 seconds. No rash noted. No erythema.   Psychiatric: She has a normal mood and affect. Thought content normal.         ED Course   Procedures  Labs Reviewed "   CBC W/ AUTO DIFFERENTIAL - Abnormal; Notable for the following components:       Result Value    RBC 3.86 (*)     Hemoglobin 11.9 (*)     Hematocrit 36.0 (*)     Gran # (ANC) 8.9 (*)     Immature Grans (Abs) 0.06 (*)     Gran% 74.0 (*)     Lymph% 16.9 (*)     All other components within normal limits   COMPREHENSIVE METABOLIC PANEL - Abnormal; Notable for the following components:    CO2 21 (*)     Calcium 8.5 (*)     All other components within normal limits   HCG, QUANTITATIVE, PREGNANCY   BLUE-TOP TUBE   LAVENDER-TOP TUBE   GOLD-TOP TUBE   RED-TOP TUBE   TYPE & SCREEN          Imaging Results          US OB Less Than 14 Wks with Transvag(xpd (Final result)  Result time 20 00:04:24    Final result by Nataliia Claros MD (20 00:04:24)                 Impression:      No intrauterine pregnancy or gestational sac visualized, technically pregnancy of unknown location.  Findings may relate to early pregnancy or spontaneous .  No adnexal abnormalities or significant free fluid seen to suggest ectopic pregnancy.  Recommend serial beta hCGs and repeat pelvic ultrasound as clinically warranted.    Note is made that recently visualized right adnexal mass was not seen on today's exam.      Electronically signed by: Nataliia Claros MD  Date:    2020  Time:    00:04             Narrative:    EXAMINATION:  US OB LESS THAN 14 WKS WITH TRANSVAGINAL (XPD)    CLINICAL HISTORY:  Vag Bleeding;    TECHNIQUE:  Transabdominal sonography of the pelvis was performed, followed by transvaginal sonography to better evaluate the uterus and ovaries.    COMPARISON:  2019.    FINDINGS:  The uterus measures 7.9 x 3.6 x 4.6 cm. Uterine parenchyma is heterogenous in echotexture with suspected posterior fibroid seen measuring 1.4 cm.  No intrauterine pregnancy or gestational sac seen.  Endometrium is normal in thickness measuring 7 mm.    The right ovary measures 2.3 x 3.1 x 1.4 cm. The left ovary measures 2.8 x 1.2  x 2.4 cm. Arterial and venous flow are preserved bilaterally.  Possible corpus luteum cyst measuring 1.2 x 0.4 x 1.5 cm is seen on the left.  No adnexal abnormalities are seen.  No significant free fluid is seen.                                 Medical Decision Making:   History:   Old Medical Records: I decided to obtain old medical records.  Old Records Summarized: other records and records from clinic visits.       <> Summary of Records: The patient was evaluated on December 28 at [facility] where US was concerning for possible right ectopic pregnancy. OBGYN wanted to admit for serial exams but the patient did not want to be admitted. She was evaluated again on the 30th by OBGYN - repeat US was concerning for ectopic pregnancy, but she was not having any vaginal bleeding or pain therefore the decision was made for observation.  Differential Diagnosis:   Ectopic pregnancy, threatened miscarriage, miscarriage, urinary tract infection, acute pyelonephritis, ovarian torsion, ovarian cyst rupture, PID, BV, TOA, , ureterolithiais, AAA rupture / dissection, diverticulitis, colitis, inflammatory bowel disease, gastroenteritis, gastritis, ulcer, cholecystitis, gallstones, pancreatitis, ileus, small bowel obstruction, appendicitis, constipation, intestinal gas pain, GERD, intestinal spasm,  intrabominal hemorrhage, intrabominal thrombus      Clinical Tests:   Lab Tests: Ordered and Reviewed  Radiological Study: Ordered and Reviewed  ED Management:  After taking into careful account the patient's historical factors, physical exam findings, empirical and objective data obtained on ED workup, the patient appears to be low risk for an emergent medical condition. I feel it is safe and appropriate at this time for the patient to be discharged for follow up and re-evaluation as detailed in the discharge instructions. I have discussed the specifics of the workup with the patient/guardian and the patient/guardian has  verbalized understanding of the details of the workup, the diagnosis, the treatment plan, and the need for outpatient follow-up.  Although the patient has no emergent etiology today this does not preclude the development of an emergent condition some in addition, I have advised the patient that they can return to the ED and/or activated EMS at any time with worsening of her symptoms, change of their symptoms, or with any other medical complaints.  Patient's/guardian understands the emergency department visit today was primarily to address immediate concerns and to rule out emergent causes of the symptoms that may require further workup and evaluation as an outpatient.  All questions addressed and patient's/guardian given discharge instructions and follow-up information.  Patient improved with treatment in the emergency department and is comfortable going home.  Educated the patient on warning signs and symptoms for which they must seek immediate medical attention.  I emphasized the importance of followup.  Patient understands that the emergency visit today is primarily to address immediate concerns and to rule out emergent cause of symptoms and that they may require further workup and evaluation as an outpatient. All questions addressed and patient given discharge instructions and followup information.               Scribe Attestation:   Scribe #1: I performed the above scribed service and the documentation accurately describes the services I performed. I attest to the accuracy of the note.    Attending Attestation:           Physician Attestation for Scribe:  Physician Attestation Statement for Scribe #1: I, Dr. Leonardo, reviewed documentation, as scribed by Surekha Martinez in my presence, and it is both accurate and complete.                 ED Course as of Jan 02 1353   Thu Jan 02, 2020   0035 Patient's blood pressure still 90s, however she did not receive the entire L fluid since she was at [facility], so will  reassess after she has gotten her L. H&H today 11.9/36 as compared to 12/28 when it was 13.2/39.3.  Ultrasound today shows cyst 1.2 cm versus 2.2 cm on the 30th and no ring-enhancing lesion this time.    [MA]   0118 Ambulating without any lightheadedness     [MA]   0223 Case discussed with OBGYN, reports reviewed findgins and chart with staff who has been following patient in clinic, ok to d/c to home and f/u for repeat beta hcg in one week    [MA]      ED Course User Index  [MA] Mario Leonardo MD                Clinical Impression:     1. Complete miscarriage                                Mario Leonardo MD  01/02/20 7782

## 2020-01-02 NOTE — ED NOTES
MD cheng pt ambulated with steady gait.  No assistance needed.  HR remained in the 80's.  Pt denies any dizziness or weakness.

## 2020-01-02 NOTE — ED NOTES
Pt ambulate down the baron with steady gait, on continuous monitoring. HR reading 88, pulse ox 100 percent on RA. Pt denies SOB, dizziness. Will notify provider.

## 2020-01-03 ENCOUNTER — TELEPHONE (OUTPATIENT)
Dept: OBSTETRICS AND GYNECOLOGY | Facility: CLINIC | Age: 30
End: 2020-01-03

## 2020-01-03 NOTE — TELEPHONE ENCOUNTER
Called patient to inform of results per Racquel Womack NP per orders. Left voice message to call office back.       Please call pt and inform her she is negative for chlamydia and gonorrhea but positive for BV and trichomonas. I have sent in a prescription for flagyl 500 mg BID x 7 days which will treat both infections. She will need to have her partner tested and treated if positive for trich and abstain from unprotected intercourse x 1 week after both are treated. She will need to RTC in 6 weeks for LAVONNE. Please advise her to avoid alcohol while taking flagyl. Thanks so much!     Racquel   Associated Results     Result Notes for Vaginosis Screen by DNA Probe     Notes recorded by Racquel Womack NP on 1/2/2020 at 12:01 PM CST  Please call pt and inform her she is negative for chlamydia and gonorrhea but positive for BV and trichomonas. I have sent in a prescription for flagyl 500 mg BID x 7 days which will treat both infections. She will need to have her partner tested and treated if positive for trich and abstain from unprotected intercourse x 1 week after both are treated. She will need to RTC in 6 weeks for LAVONNE. Please advise her to avoid alcohol while taking flagyl. Thanks so much!    Racquel  Contains abnormal data Vaginosis Screen by DNA Probe   Order: [id number]   Status:  Final result   Visible to patient:  No (Not Released) Dx:  Pregnancy of unknown anatomic location   Specimen Information: Cervicovaginal; Genital       Ref Range & Units 3d ago  Trichomonas vaginalis Negative PositiveAbnormal    Candida sp Negative Negative   Comment: Candida species group includes: Candida albicans, Candida tropicalis,   Candida parapsiolosis, Candida dubliniensis   Cyndi glabrata DNA Negative Negative   Candida krusei DNA Negative Negative   Bacterial vaginosis DNA Negative PositiveAbnormal    Resulting Agency  [facility]      Specimen Collected: 12/30/19 11:50 Last Resulted: 12/31/19 03:36       Lab Flowsheet       Order Details      View Encounter      Lab and Collection Details      Routing      Result History

## 2020-01-14 ENCOUNTER — TELEPHONE (OUTPATIENT)
Dept: OBSTETRICS AND GYNECOLOGY | Facility: HOSPITAL | Age: 30
End: 2020-01-14

## 2020-01-15 ENCOUNTER — TELEPHONE (OUTPATIENT)
Dept: OBSTETRICS AND GYNECOLOGY | Facility: CLINIC | Age: 30
End: 2020-01-15

## 2020-01-15 NOTE — TELEPHONE ENCOUNTER
Called pt on 1/6/20 to return call and f/u on treatment for BV and trich; pt did not answer. Left message to call back with call back number.

## 2020-01-24 ENCOUNTER — TELEPHONE (OUTPATIENT)
Dept: OBSTETRICS AND GYNECOLOGY | Facility: CLINIC | Age: 30
End: 2020-01-24

## 2020-01-24 NOTE — TELEPHONE ENCOUNTER
Called patient to follow-up on results from last visit. No answer. Left voice message to call office back.      Please call pt and inform her she is negative for chlamydia and gonorrhea but positive for BV and trichomonas. I have sent in a prescription for flagyl 500 mg BID x 7 days which will treat both infections. She will need to have her partner tested and treated if positive for trich and abstain from unprotected intercourse x 1 week after both are treated. She will need to RTC in 6 weeks for LAVONNE. Please advise her to avoid alcohol while taking flagyl. Thanks so much!     Racquel

## 2020-01-24 NOTE — TELEPHONE ENCOUNTER
----- Message from Mrage Brennan LPN sent at 1/2/2020  5:11 PM CST -----  Bonnie Adams  Female, 29 y.o., 1990  Phone:   [phone] (M)  PCP:   Primary Doctor No  Language:   English  Need Interp:   No  Allergies Last Reviewed:   01/02/20  Allergies:   No Known Allergies  Health Maintenance:   Due  Pregnancy Status:   Primary Ins.:   None  MRN:   [id number]  Pt Comm Pref:   Patient Portal, Mail  Next Appt:   None  My Sticky Note:     Specialty Comments:      Message   Received: Today   Message Contents   KRISSY Navarro Staff         Please call pt and inform her she is negative for chlamydia and gonorrhea but positive for BV and trichomonas. I have sent in a prescription for flagyl 500 mg BID x 7 days which will treat both infections. She will need to have her partner tested and treated if positive for trich and abstain from unprotected intercourse x 1 week after both are treated. She will need to RTC in 6 weeks for LAVONNE. Please advise her to avoid alcohol while taking flagyl. Thanks so much!     Racquel   Associated Results     Result Notes for Vaginosis Screen by DNA Probe     Notes recorded by Racquel Woamck NP on 1/2/2020 at 12:01 PM CST  Please call pt and inform her she is negative for chlamydia and gonorrhea but positive for BV and trichomonas. I have sent in a prescription for flagyl 500 mg BID x 7 days which will treat both infections. She will need to have her partner tested and treated if positive for trich and abstain from unprotected intercourse x 1 week after both are treated. She will need to RTC in 6 weeks for LAVONNE. Please advise her to avoid alcohol while taking flagyl. Thanks so much!    Racquel  Contains abnormal data Vaginosis Screen by DNA Probe   Order: [id number]   Status:  Final result   Visible to patient:  No (Not Released) Dx:  Pregnancy of unknown anatomic location   Specimen Information: Cervicovaginal; Genital       Ref Range &  Units 3d ago  Trichomonas vaginalis Negative PositiveAbnormal    Candida sp Negative Negative   Comment: Candida species group includes: Candida albicans, Candida tropicalis,   Candida parapsiolosis, Candida dubliniensis   Cyndi glabrata DNA Negative Negative   Candida krusei DNA Negative Negative   Bacterial vaginosis DNA Negative PositiveAbnormal    Resulting Agency  [facility]      Specimen Collected: 12/30/19 11:50 Last Resulted: 12/31/19 03:36       Lab Flowsheet      Order Details      View Encounter      Lab and Collection Details      Routing      Result History

## 2020-01-31 ENCOUNTER — TELEPHONE (OUTPATIENT)
Dept: OBSTETRICS AND GYNECOLOGY | Facility: HOSPITAL | Age: 30
End: 2020-01-31

## 2020-02-07 ENCOUNTER — DOCUMENTATION ONLY (OUTPATIENT)
Dept: OBSTETRICS AND GYNECOLOGY | Facility: HOSPITAL | Age: 30
End: 2020-02-07

## 2020-02-07 NOTE — PROGRESS NOTES
Patient has been called numerous times regarding importance of following up with weekly beta hcg levels. Letter sent to patient today asking to get labs drawn. Will remove patient from beta book if has not followed up in 1 week.

## 2020-02-07 NOTE — LETTER
February 7, 2020    Bonnie Deana Adams  [address] [place] [id number]                [address] [facility] [address]  Phone: [phone]  Fax: [phone] Dear Ms. Adams:    We have been trying to contact you regarding your lab results. It is very important that you come in for a repeat lab. We need to track your beta-hcg levels down until they are below 5. There is an order in for you to get your blood drawn at [facility]. The lab is open from 8-5 Monday-Friday. Please follow up as soon as possible. This will be your last notice.  Sincerely,        Rupert Curtis MD

## 2023-07-24 ENCOUNTER — TELEPHONE (OUTPATIENT)
Dept: OBSTETRICS AND GYNECOLOGY | Facility: CLINIC | Age: 33
End: 2023-07-24
Payer: MEDICAID

## 2023-07-24 NOTE — TELEPHONE ENCOUNTER
Returned pts call. Pt requested to move appt to later in the day for tomorrow. Informed pt we do not have that avail. Pt vu and stated she would keep appt    ----- Message from Dayami Jacob sent at 7/24/2023 10:41 AM CDT -----  Name of Who is Calling:TAMI GRACIA [[id number]]           What is the request in detail:  pt stated that she would like to speak with the office directly in regards to her questions/concerns about changing her appt time on tomorrow. PT stated she would like anything after 10 am.Please contact to further discuss and advise.          Can the clinic reply by [facility]:[phone]           What Number to Call Back if not in [facility]:[phone]

## 2023-12-16 ENCOUNTER — HOSPITAL ENCOUNTER (EMERGENCY)
Facility: OTHER | Age: 33
Discharge: HOME | End: 2023-12-16
Attending: STUDENT IN AN ORGANIZED HEALTH CARE EDUCATION/TRAINING PROGRAM | Admitting: STUDENT IN AN ORGANIZED HEALTH CARE EDUCATION/TRAINING PROGRAM
Payer: MEDICAID

## 2023-12-16 VITALS
HEART RATE: 95 BPM | RESPIRATION RATE: 18 BRPM | HEIGHT: 63 IN | OXYGEN SATURATION: 98 % | SYSTOLIC BLOOD PRESSURE: 130 MMHG | WEIGHT: 220 LBS | BODY MASS INDEX: 38.98 KG/M2 | DIASTOLIC BLOOD PRESSURE: 89 MMHG | TEMPERATURE: 99 F

## 2023-12-16 DIAGNOSIS — O00.90 ECTOPIC PREGNANCY, UNSPECIFIED LOCATION, UNSPECIFIED WHETHER INTRAUTERINE PREGNANCY PRESENT: Primary | ICD-10-CM

## 2023-12-16 DIAGNOSIS — O00.90 ECTOPIC PREGNANCY: ICD-10-CM

## 2023-12-16 LAB
ALBUMIN SERPL BCP-MCNC: 4.1 G/DL (ref 3.5–5.2)
ALP SERPL-CCNC: 151 U/L (ref 55–135)
ALT SERPL W/O P-5'-P-CCNC: 24 U/L (ref 10–44)
ANION GAP SERPL CALC-SCNC: 12 MMOL/L (ref 8–16)
AST SERPL-CCNC: 33 U/L (ref 10–40)
B-HCG UR QL: POSITIVE
BACTERIA #/AREA URNS HPF: ABNORMAL /HPF
BASOPHILS # BLD AUTO: 0.07 K/UL (ref 0–0.2)
BASOPHILS NFR BLD: 0.5 % (ref 0–1.9)
BILIRUB SERPL-MCNC: 0.2 MG/DL (ref 0.1–1)
BILIRUB UR QL STRIP: ABNORMAL
BUN SERPL-MCNC: 9 MG/DL (ref 6–20)
CALCIUM SERPL-MCNC: 9.6 MG/DL (ref 8.7–10.5)
CHLORIDE SERPL-SCNC: 105 MMOL/L (ref 95–110)
CLARITY UR: ABNORMAL
CO2 SERPL-SCNC: 21 MMOL/L (ref 23–29)
COLOR UR: YELLOW
CREAT SERPL-MCNC: 0.7 MG/DL (ref 0.5–1.4)
CTP QC/QA: YES
DIFFERENTIAL METHOD BLD: ABNORMAL
EOSINOPHIL # BLD AUTO: 0.1 K/UL (ref 0–0.5)
EOSINOPHIL NFR BLD: 0.5 % (ref 0–8)
ERYTHROCYTE [DISTWIDTH] IN BLOOD BY AUTOMATED COUNT: 13.2 % (ref 11.5–14.5)
EST. GFR  (NO RACE VARIABLE): >60 ML/MIN/1.73 M^2
GLUCOSE SERPL-MCNC: 93 MG/DL (ref 70–110)
GLUCOSE UR QL STRIP: NEGATIVE
HCG INTACT+B SERPL-ACNC: 1653 MIU/ML
HCT VFR BLD AUTO: 43.1 % (ref 37–48.5)
HGB BLD-MCNC: 14.8 G/DL (ref 12–16)
HGB UR QL STRIP: ABNORMAL
HYALINE CASTS #/AREA URNS LPF: 1 /LPF
IMM GRANULOCYTES # BLD AUTO: 0.06 K/UL (ref 0–0.04)
IMM GRANULOCYTES NFR BLD AUTO: 0.4 % (ref 0–0.5)
KETONES UR QL STRIP: ABNORMAL
LEUKOCYTE ESTERASE UR QL STRIP: NEGATIVE
LYMPHOCYTES # BLD AUTO: 2.8 K/UL (ref 1–4.8)
LYMPHOCYTES NFR BLD: 19.3 % (ref 18–48)
MCH RBC QN AUTO: 30.6 PG (ref 27–31)
MCHC RBC AUTO-ENTMCNC: 34.3 G/DL (ref 32–36)
MCV RBC AUTO: 89 FL (ref 82–98)
MICROSCOPIC COMMENT: ABNORMAL
MONOCYTES # BLD AUTO: 0.9 K/UL (ref 0.3–1)
MONOCYTES NFR BLD: 5.9 % (ref 4–15)
NEUTROPHILS # BLD AUTO: 10.7 K/UL (ref 1.8–7.7)
NEUTROPHILS NFR BLD: 73.4 % (ref 38–73)
NITRITE UR QL STRIP: NEGATIVE
NRBC BLD-RTO: 0 /100 WBC
PH UR STRIP: 6 [PH] (ref 5–8)
PLATELET # BLD AUTO: 385 K/UL (ref 150–450)
PMV BLD AUTO: 8.8 FL (ref 9.2–12.9)
POTASSIUM SERPL-SCNC: 3.9 MMOL/L (ref 3.5–5.1)
PROT SERPL-MCNC: 8.1 G/DL (ref 6–8.4)
PROT UR QL STRIP: ABNORMAL
RBC # BLD AUTO: 4.84 M/UL (ref 4–5.4)
RBC #/AREA URNS HPF: 60 /HPF (ref 0–4)
SODIUM SERPL-SCNC: 138 MMOL/L (ref 136–145)
SP GR UR STRIP: >=1.03 (ref 1–1.03)
SQUAMOUS #/AREA URNS HPF: 10 /HPF
URN SPEC COLLECT METH UR: ABNORMAL
UROBILINOGEN UR STRIP-ACNC: 1 EU/DL
WBC # BLD AUTO: 14.62 K/UL (ref 3.9–12.7)
WBC #/AREA URNS HPF: 2 /HPF (ref 0–5)

## 2023-12-16 PROCEDURE — 81000 URINALYSIS NONAUTO W/SCOPE: CPT | Performed by: EMERGENCY MEDICINE

## 2023-12-16 PROCEDURE — 80053 COMPREHEN METABOLIC PANEL: CPT | Performed by: NURSE PRACTITIONER

## 2023-12-16 PROCEDURE — 96360 HYDRATION IV INFUSION INIT: CPT

## 2023-12-16 PROCEDURE — 85025 COMPLETE CBC W/AUTO DIFF WBC: CPT | Performed by: NURSE PRACTITIONER

## 2023-12-16 PROCEDURE — 25000003 PHARM REV CODE 250: Performed by: NURSE PRACTITIONER

## 2023-12-16 PROCEDURE — 96401 CHEMO ANTI-NEOPL SQ/IM: CPT

## 2023-12-16 PROCEDURE — 25000003 PHARM REV CODE 250

## 2023-12-16 PROCEDURE — 99285 EMERGENCY DEPT VISIT HI MDM: CPT | Mod: ,,, | Performed by: STUDENT IN AN ORGANIZED HEALTH CARE EDUCATION/TRAINING PROGRAM

## 2023-12-16 PROCEDURE — 96372 THER/PROPH/DIAG INJ SC/IM: CPT | Mod: 59

## 2023-12-16 PROCEDURE — 81025 URINE PREGNANCY TEST: CPT | Performed by: EMERGENCY MEDICINE

## 2023-12-16 PROCEDURE — 99285 EMERGENCY DEPT VISIT HI MDM: CPT | Mod: 25

## 2023-12-16 PROCEDURE — 63600175 PHARM REV CODE 636 W HCPCS

## 2023-12-16 PROCEDURE — 84702 CHORIONIC GONADOTROPIN TEST: CPT | Performed by: NURSE PRACTITIONER

## 2023-12-16 RX ORDER — MEDROXYPROGESTERONE ACETATE 150 MG/ML
150 INJECTION, SUSPENSION INTRAMUSCULAR ONCE
Status: COMPLETED | OUTPATIENT
Start: 2023-12-16 | End: 2023-12-16

## 2023-12-16 RX ORDER — ONDANSETRON 8 MG/1
8 TABLET, ORALLY DISINTEGRATING ORAL EVERY 8 HOURS PRN
Status: DISCONTINUED | OUTPATIENT
Start: 2023-12-16 | End: 2023-12-17 | Stop reason: HOSPADM

## 2023-12-16 RX ORDER — METHOTREXATE 25 MG/ML
50 INJECTION INTRA-ARTERIAL; INTRAMUSCULAR; INTRATHECAL; INTRAVENOUS ONCE
Status: COMPLETED | OUTPATIENT
Start: 2023-12-16 | End: 2023-12-16

## 2023-12-16 RX ORDER — PROCHLORPERAZINE EDISYLATE 5 MG/ML
5 INJECTION INTRAMUSCULAR; INTRAVENOUS EVERY 6 HOURS PRN
Status: DISCONTINUED | OUTPATIENT
Start: 2023-12-16 | End: 2023-12-17 | Stop reason: HOSPADM

## 2023-12-16 RX ADMIN — SODIUM CHLORIDE 1000 ML: 0.9 INJECTION, SOLUTION INTRAVENOUS at 07:12

## 2023-12-16 RX ADMIN — MEDROXYPROGESTERONE ACETATE 150 MG: 150 INJECTION, SUSPENSION, EXTENDED RELEASE INTRAMUSCULAR at 10:12

## 2023-12-16 RX ADMIN — METHOTREXATE 105 MG: 25 SOLUTION INTRA-ARTERIAL; INTRAMUSCULAR; INTRATHECAL; INTRAVENOUS at 10:12

## 2023-12-16 NOTE — Clinical Note
Diagnosis: Ectopic pregnancy [690205]   Future Attending Provider: NAREN BANKS [[id number]]   Admitting Provider:: NAREN BANKS [[id number]]

## 2023-12-17 NOTE — ED TRIAGE NOTES
Pt presents to ED c/o vaginal bleeding onset 1 hr PTA. +Abdominal cramping. Pt reports minimal spotting 2 days ago. Denies heavy bleeding, nausea and vomiting. AAOx4, NAD noted.

## 2023-12-17 NOTE — FIRST PROVIDER EVALUATION
Emergency Department TeleTriage Encounter Note      CHIEF COMPLAINT    Chief Complaint   Patient presents with    Vaginal Bleeding     4-5 weeks pregnant, vaginal bleeding onset 1 hour ago. Reports minimal spotting a couple days ago. Pt denies heavy bleeding. Reports dark red blood without blood clots. Reports mild abd cramping.        VITAL SIGNS   Initial Vitals [12/16/23 1806]   BP Pulse Resp Temp SpO2   137/88 107 18 98.6 °F (37 °C) 100 %      MAP       --            ALLERGIES    Review of patient's allergies indicates:  No Known Allergies    PROVIDER TRIAGE NOTE  This is a teletriage evaluation of a 33 y.o. female presenting to the ED complaining of vaginal bleeding starting today. Pt is pregnant, has not had US to confirm IUP. LMP around 11/10.  States she is having pelvic cramping.      Alert, no distress.  Blood type O+ 12/28/19.     Initial orders will be placed and care will be transferred to an alternate provider when patient is roomed for a full evaluation. Any additional orders and the final disposition will be determined by that provider.         ORDERS  Labs Reviewed   POCT URINE PREGNANCY - Abnormal; Notable for the following components:       Result Value    POC Preg Test, Ur Positive (*)     All other components within normal limits   URINALYSIS, REFLEX TO URINE CULTURE       ED Orders (720h ago, onward)      Start Ordered     Status Ordering Provider    12/16/23 1830 12/16/23 1824  sodium chloride 0.9% bolus 1,000 mL 1,000 mL  ED 1 Time         Ordered SCOTTIE GUZMAN N.    12/16/23 1825 12/16/23 1824  Insert peripheral IV  Once         Ordered SCOTTIE GUZMAN N.    12/16/23 1825 12/16/23 1824  CBC W/ AUTO DIFFERENTIAL  STAT         Ordered SCOTTIE GUZMAN N.    12/16/23 1825 12/16/23 1824  Comp. Metabolic Panel  STAT         Ordered SCOTTIE GUZMAN N.    12/16/23 1825 12/16/23 1824  hCG, quantitative  STAT         Ordered SCOTTIE GUZMAN N.    12/16/23 1825  12/16/23 1824  Setup Pelvic Tray  Once         Ordered SCOTTIE GUZMAN N.    12/16/23 1825 12/16/23 1824  US OB <14 Wks TransAbd & TransVag, Single Gestation (XPD)  1 time imaging         Ordered SCOTTIE GUZMAN N.    12/16/23 1825 12/16/23 1824  Urinalysis, Reflex to Urine Culture Urine, Clean Catch  STAT         Ordered AMEENAYOGIRASHIPARDEEP SCOTTIE N.    12/16/23 1815 12/16/23 1814  POCT urine pregnancy  Once         Final result JANEY GRESHAM II    12/16/23 1815 12/16/23 1814  Urinalysis, Reflex to Urine Culture Urine, Clean Catch  STAT         In process JANEY GRESHAM II              Virtual Visit Note: The provider triage portion of this emergency department evaluation and documentation was performed via [employer], a HIPAA-compliant telemedicine application, in concert with a tele-presenter in the room. A face to face patient evaluation with one of my colleagues will occur once the patient is placed in an emergency department room.      DISCLAIMER: This note was prepared with [employer] voice recognition transcription software. Garbled syntax, mangled pronouns, and other bizarre constructions may be attributed to that software system.

## 2023-12-17 NOTE — CONSULTS
[facility] Emergency Dept  Obstetrics & Gynecology  Consult Note    Patient Name: Bonnie Adams  MRN: [id number]  Admission Date: 2023  Hospital Length of Stay: 0 days  Code Status: No Order  Primary Care Provider: [facility]  Principal Problem: <principal problem not specified>    Inpatient consult to Obstetrics  Consult performed by: Kay Woodruff MD  Consult ordered by: Juana Paulson PA-C        Subjective:     Chief Complaint: Ectopic Pregnancy    History of Present Illness: 33 y.o.  @ 5w1d based on LMP (11/10/2023) who presented to ED due to reports of abdominal cramping. Patient states she found out she was pregnant on Wednesday, then had mild pink-tinged discharge on Thursday with following abdominal cramping. She reports lower pelvic pain and LLQ. No active bleeding or clots expressed.    No fever, chills, CP, SOB reported.     No current facility-administered medications on file prior to encounter.     Current Outpatient Medications on File Prior to Encounter   Medication Sig    ARIPiprazole (ABILIFY) 2 MG Tab Take 2 mg by mouth.    buPROPion XL (WELLBUTRIN XL) 200 MG Take by mouth once daily.    etodolac (LODINE) 200 MG Cap Take 1 capsule (200 mg total) by mouth 3 (three) times daily.    gabapentin (NEURONTIN) 600 MG tablet Take 300 mg by mouth 2 (two) times daily.    HYDROcodone-acetaminophen (NORCO) 5-325 mg per tablet Take 1 tablet by mouth every 4 (four) hours as needed for Pain.    ketorolac (TORADOL) 10 mg tablet Take 1 tablet (10 mg total) by mouth every 6 (six) hours.    ondansetron (ZOFRAN-ODT) 4 MG TbDL Take 1 tablet (4 mg total) by mouth 3 (three) times daily.    prenatal vit/iron fum/folic ac (PRENATAL 1+1 ORAL) Take by mouth.    sertraline (ZOLOFT) 100 MG tablet Take 200 mg by mouth every evening.    tamsulosin (FLOMAX) 0.4 mg Cap Take 1 capsule (0.4 mg total) by mouth once daily.       Review of patient's allergies indicates:  No Known  Allergies    Past Medical History:   Diagnosis Date    Depression     Scoliosis      OB History    Para Term  AB Living   2 0 0 0 0 0   SAB IAB Ectopic Multiple Live Births   0 0 0 0 0      # Outcome Date GA Lbr Leo/2nd Weight Sex Delivery Anes PTL Lv   2             1               No past surgical history on file.  Family History    None       Tobacco Use    Smoking status: Every Day     Current packs/day: 1.00     Types: Cigarettes    Smokeless tobacco: Not on file   Substance and Sexual Activity    Alcohol use: Yes     Comment: sometimes    Drug use: Yes     Types: Marijuana    Sexual activity: Not on file     Review of Systems   Constitutional:  Negative for chills and fever.   Respiratory:  Negative for shortness of breath.    Cardiovascular:  Negative for chest pain.   Gastrointestinal:  Positive for abdominal pain. Negative for diarrhea and vomiting.   Genitourinary:  Positive for pelvic pain. Negative for vaginal bleeding and vaginal discharge.   Neurological:  Negative for headaches.   Psychiatric/Behavioral:  Negative for depression. The patient is not nervous/anxious.      Objective:     Vital Signs (Most Recent):  Temp: 98.6 °F (37 °C) (23 1806)  Pulse: 95 (23 2313)  Resp: 18 (23 2313)  BP: 130/89 (23)  SpO2: 98 % (23) Vital Signs (24h Range):  Temp:  [98.6 °F (37 °C)] 98.6 °F (37 °C)  Pulse:  [] 95  Resp:  [18] 18  SpO2:  [98 %-100 %] 98 %  BP: (130-137)/(88-89) 130/89     Weight: 99.8 kg (220 lb)  Body mass index is 38.97 kg/m².  No LMP recorded.    Physical Exam:   Constitutional: She appears well-developed and well-nourished. No distress.    HENT:   Head: Normocephalic and atraumatic.    Eyes: Conjunctivae are normal.     Cardiovascular:  Normal rate.             Pulmonary/Chest: Effort normal.                      Neurological: She is alert.    Skin: Skin is warm and dry.    Psychiatric: She has a normal mood and affect. Her  behavior is normal. Thought content normal.       Laboratory:  Recent Labs   Lab 23   WBC 14.62*   HGB 14.8   HCT 43.1   MCV 89         Recent Labs   Lab 23      K 3.9      CO2 21*   BUN 9   CREATININE 0.7   GLU 93   PROT 8.1   BILITOT 0.2   ALKPHOS 151*   ALT 24   AST 33       Beta hC     Diagnostic Results:  EXAMINATION:  US OB <14 WEEKS, TRANSABDOM & TRANSVAG, SINGLE GESTATION (XPD)     CLINICAL HISTORY:  Vag Bleeding;     TECHNIQUE:  Transabdominal sonography of the pelvis was performed, followed by transvaginal sonography to better evaluate the uterus and ovaries.     COMPARISON:  None.     FINDINGS:  The uterus measures 8 x 4 x 5 cm. Uterine parenchyma is heterogenous in echotexture.  No intrauterine pregnancy or gestational sac seen.  There is mild endometrial thickening measuring 17 mm.     The right ovary measures 3 x 2 x 3 cm. The left ovary measures 2 x 2 x 2 cm. Arterial and venous flow are preserved bilaterally.  In the right adnexal region adjacent to the right ovary there is small complex structure measuring 1.3 cm with anechoic cystic component most likely representing right-sided ectopic pregnancy.  No fetal pole is visualized.  No significant free fluid is seen.     Impression:     No intrauterine pregnancy seen.  Small right adnexal structure adjacent to but separate from the right ovary likely reflects right ectopic pregnancy.  No free fluid seen to suggest ruptured ectopic at this time.  Ob gyn consultation is advised if not already obtained.     COMMUNICATION  This critical result was discovered/received on 2023 at 19:23.     The critical information above was relayed directly by Nataliia Claros MD by telephone to Diane Good NP on 2023 at 19:23.     This report was flagged in Epic as abnormal.    Assessment/Plan:     Bonnie Adams is a 32 yo  at approximately 5w EGA with suspected right ectopic pregnancy.      Ectopic Pregnancy:   - Exam: benign with no concern for peritoneal signs   - VSS, AF   - CBC: stable   - CMP: WNL   - Beta hC   - TVUS consistent with right adnexal mass, likely right ectopic pregnancy, no free fluid present   - Discussed likelihood that pregnancy is consistent with ectopic pregnancy given ultrasound findings and beta HCG of 1653. Discussed management including expectant, medical, and surgical. Discussed risks/ benefits/alternatives associated with each, including rupture with subsequent hemorrhage which could be life threatening. Additionally, discussed possible need for surgical intervention if patient were to rupture while undergoing MTX treatment or if MTX no efficacious in management. Patient has no contraindications to MTX administration and desires to proceed with MTX (single-dose regimen). Patient understands risks and accepts. Consents reviewed and signed with patient at bedside.   - Patient to receive MTX and have subsequent lab draws on Day 4 and Day 7 (with goal of decreased 15% between D4 and D7).   - Patient [facility] with [facility] (). Patient added to beta book for follow-up and FMP/Beta Book Residents notified.   - Contraception: Depo Provera   - Discussed current recommendation to abstain from pregnancy for 3 months per ACOG .   - Patient evaluated and discussed with Dr. Watkins.     Thank you for your consult. I will sign off. Please contact us if you have any additional questions.      Kay Woodruff MD  Obstetrics & Gynecology  [facility] - Emergency Dept

## 2023-12-17 NOTE — ED PROVIDER NOTES
Encounter Date: 2023       History     Chief Complaint   Patient presents with    Vaginal Bleeding     4-5 weeks pregnant, vaginal bleeding onset 1 hour ago. Reports minimal spotting a couple days ago. Pt denies heavy bleeding. Reports dark red blood without blood clots. Reports mild abd cramping.      This is a 33-year-old  approximately 5 wga (based on LMP) pregnant female who presents to the ED with complaints of vaginal bleeding that started about one hour prior to arrival.  Patient describes minimal spotting that started a couple days ago that she noticed when wiping.  Today had some or dark red blood while wiping.  She has not soaked through any pads and denies any heavy bleeding.  Has not passed any clots.  She does have mild abdominal cramping.  Reports mild back pain, however, has scoliosis and back pain at baseline in does not believe this is worse than normal for her.  Denies fever, chills, shortness of breath, N/V/D.    The history is provided by the patient.     Review of patient's allergies indicates:  No Known Allergies  Past Medical History:   Diagnosis Date    Depression     Scoliosis      No past surgical history on file.  Family History   Problem Relation Age of Onset    Breast cancer Neg Hx     Colon cancer Neg Hx     Ovarian cancer Neg Hx      Social History     Tobacco Use    Smoking status: Every Day     Current packs/day: 1.00     Types: Cigarettes   Substance Use Topics    Alcohol use: Yes     Comment: sometimes    Drug use: Yes     Types: Marijuana     Review of Systems  As per HPI above  Physical Exam     Initial Vitals [23 1806]   BP Pulse Resp Temp SpO2   137/88 107 18 98.6 °F (37 °C) 100 %      MAP       --         Physical Exam    Constitutional: She appears well-developed and well-nourished.  Non-toxic appearance. She does not appear ill. No distress.   HENT:   Head: Normocephalic and atraumatic.   Eyes: Conjunctivae are normal.   Neck: Neck supple.   Cardiovascular:   Normal rate and regular rhythm.           Pulmonary/Chest: Effort normal. No tachypnea. No respiratory distress.   Abdominal: She exhibits no distension. There is abdominal tenderness in the left lower quadrant. There is no rebound and no guarding.   Genitourinary:    Genitourinary Comments: Pelvic exam deferred to Ob/gyn team     Musculoskeletal:      Cervical back: Neck supple.     Neurological: She is alert and oriented to person, place, and time.   Skin: Skin is warm, dry and intact.   Psychiatric: She has a normal mood and affect. Her speech is normal and behavior is normal.         ED Course   Procedures  Labs Reviewed   URINALYSIS, REFLEX TO URINE CULTURE - Abnormal; Notable for the following components:       Result Value    Appearance, UA Hazy (*)     Specific Gravity, UA >=1.030 (*)     Protein, UA 1+ (*)     Ketones, UA 1+ (*)     Bilirubin (UA) 2+ (*)     Occult Blood UA 3+ (*)     All other components within normal limits    Narrative:     Specimen Source->Urine   CBC W/ AUTO DIFFERENTIAL - Abnormal; Notable for the following components:    WBC 14.62 (*)     MPV 8.8 (*)     Gran # (ANC) 10.7 (*)     Immature Grans (Abs) 0.06 (*)     Gran % 73.4 (*)     All other components within normal limits    Narrative:     Release to patient->Immediate   COMPREHENSIVE METABOLIC PANEL - Abnormal; Notable for the following components:    CO2 21 (*)     Alkaline Phosphatase 151 (*)     All other components within normal limits    Narrative:     Release to patient->Immediate   URINALYSIS MICROSCOPIC - Abnormal; Notable for the following components:    RBC, UA 60 (*)     All other components within normal limits    Narrative:     Specimen Source->Urine   POCT URINE PREGNANCY - Abnormal; Notable for the following components:    POC Preg Test, Ur Positive (*)     All other components within normal limits   HCG, QUANTITATIVE    Narrative:     Release to patient->Immediate          Imaging Results               US OB <14  Wks TransAbd & TransVag, Single Gestation (XPD) (Final result)  Result time 12/16/23 19:26:40      Final result by Nataliia Claros MD (12/16/23 19:26:40)                   Impression:      No intrauterine pregnancy seen.  Small right adnexal structure adjacent to but separate from the right ovary likely reflects right ectopic pregnancy.  No free fluid seen to suggest ruptured ectopic at this time.  Ob gyn consultation is advised if not already obtained.    COMMUNICATION  This critical result was discovered/received on 12/16/2023 at 19:23.    The critical information above was relayed directly by Nataliia Claros MD by telephone to Diane Good NP on 12/16/2023 at 19:23.    This report was flagged in Epic as abnormal.      Electronically signed by: Nataliia Claros MD  Date:    12/16/2023  Time:    19:26               Narrative:    EXAMINATION:  US OB <14 WEEKS, TRANSABDOM & TRANSVAG, SINGLE GESTATION (XPD)    CLINICAL HISTORY:  Vag Bleeding;    TECHNIQUE:  Transabdominal sonography of the pelvis was performed, followed by transvaginal sonography to better evaluate the uterus and ovaries.    COMPARISON:  None.    FINDINGS:  The uterus measures 8 x 4 x 5 cm. Uterine parenchyma is heterogenous in echotexture.  No intrauterine pregnancy or gestational sac seen.  There is mild endometrial thickening measuring 17 mm.    The right ovary measures 3 x 2 x 3 cm. The left ovary measures 2 x 2 x 2 cm. Arterial and venous flow are preserved bilaterally.  In the right adnexal region adjacent to the right ovary there is small complex structure measuring 1.3 cm with anechoic cystic component most likely representing right-sided ectopic pregnancy.  No fetal pole is visualized.  No significant free fluid is seen.                                       Medications   sodium chloride 0.9% bolus 1,000 mL 1,000 mL (0 mLs Intravenous Stopped 12/16/23 2034)   methotrexate (PF) injection 105 mg (105 mg Intramuscular Given 12/16/23  2223)   medroxyPROGESTERone (DEPO-PROVERA) injection 150 mg (150 mg Intramuscular Given 12/16/23 2230)     Medical Decision Making  Urgent evaluation of an afebrile 33-year-old pregnant female with vaginal bleeding.  Will obtain labs, bhCG level, and ultrasound. Treat and reassess.     Differential diagnosis includes but is not limited to:  Pregnancy complication (threatened AB, inevitable AB, incomplete Ab, missed AB, uterine rupture, ectopic pregnancy, placental abruption, placenta previa), ovarian cyst/torsion, STD, foreign body, trauma, normal menstruation.    Amount and/or Complexity of Data Reviewed  Labs:  Decision-making details documented in ED Course.  Radiology:  Decision-making details documented in ED Course.               ED Course as of 12/17/23 0909   Sat Dec 16, 2023   1949 WBC(!): 14.62 [KELSIE]   1950 US OB <14 Wks TransAbd & TransVag, Single Gestation (XPD)(!)  No intrauterine pregnancy seen.  Small right adnexal structure adjacent to but separate from the right ovary likely reflects right ectopic pregnancy.  No free fluid seen to suggest ruptured ectopic at this time.  Ob gyn consultation is advised if not already obtained. [KELSIE]   1950 Spoke with Ob/gyn resident Dr. Stark given the concerning US findings. Patient is hemodynamically stable at this time. Ob/gyn will come evaluate the patient. [KELSIE]   2018 HCG Quant: 1653 [KELSIE]   2100 Care transferred to Radha Domínguez NP, at the end of my shift with plan pending ob/gyn recommendations. Patient remains hemodynamically stable without significant pain requiring analgesia. [KELSIE]   2102 Per OBGYN, plan is for methotrexate and Depo to prevent pregnancy.  Repeat beta scheduled for 4 days from today and she will follow-up with the family planning clinic. [CU]   2240 Patient given methotrexate and depo provera without complication. Patient understands return precautions and importance of followup labwork in 4 days. Patient educated on signs and symptoms to  monitor for and when to return to ED. Patient verbalized understanding agrees with treatment plan. All questions and concerns addressed.      [CU]      ED Course User Index  [CU] Radha Domínguez, NP  [KELSIE] Juana Paulson PA-C                     Clinical Impression:  Final diagnoses:  [O00.90] Ectopic pregnancy          ED Disposition Condition    Discharge Good          ED Prescriptions    None       Follow-up Information    None          Juana Paulson PA-C  12/17/23 1048

## 2023-12-18 ENCOUNTER — TELEPHONE (OUTPATIENT)
Dept: GYNECOLOGY | Facility: OTHER | Age: 33
End: 2023-12-18
Payer: MEDICAID

## 2023-12-18 NOTE — TELEPHONE ENCOUNTER
Called and spoke to Bonnie Adams about weekly beta hcg blood draws secondary to ectopic pregnancy status post treatment with Methotrexate on 12/16/23. Her last beta hcg level was 1653 on 12/16/23. I stressed the importance of having lab work done on day 4 (12/19) and again on day 7 (12/22) and trending the beta hcg levels down to <5. Patient verbalized understanding and stated she will follow up with her lab work. I instructed the patient that I would call back next week to follow up. ED precautions given.    Patient reports that she is having some abdominal cramping today but she denies any vaginal bleeding.    Patient is in agreement with the plan and all questions were answered.     Repeat beta hCG has already been ordered.    Dang Zamudio MD  Obstetrics and Gynecology - PGY1

## 2023-12-19 ENCOUNTER — LAB VISIT (OUTPATIENT)
Dept: LAB | Facility: OTHER | Age: 33
End: 2023-12-19
Payer: MEDICAID

## 2023-12-19 DIAGNOSIS — O00.90 ECTOPIC PREGNANCY, UNSPECIFIED LOCATION, UNSPECIFIED WHETHER INTRAUTERINE PREGNANCY PRESENT: ICD-10-CM

## 2023-12-19 LAB — HCG INTACT+B SERPL-ACNC: 2021 MIU/ML

## 2023-12-19 PROCEDURE — 84702 CHORIONIC GONADOTROPIN TEST: CPT

## 2023-12-19 PROCEDURE — 36415 COLL VENOUS BLD VENIPUNCTURE: CPT

## 2023-12-22 ENCOUNTER — LAB VISIT (OUTPATIENT)
Dept: LAB | Facility: OTHER | Age: 33
End: 2023-12-22

## 2023-12-22 ENCOUNTER — TELEPHONE (OUTPATIENT)
Dept: GYNECOLOGY | Facility: OTHER | Age: 33
End: 2023-12-22
Payer: MEDICAID

## 2023-12-22 DIAGNOSIS — O00.90 ECTOPIC PREGNANCY, UNSPECIFIED LOCATION, UNSPECIFIED WHETHER INTRAUTERINE PREGNANCY PRESENT: ICD-10-CM

## 2023-12-22 LAB — HCG INTACT+B SERPL-ACNC: 1728 MIU/ML

## 2023-12-22 PROCEDURE — 84702 CHORIONIC GONADOTROPIN TEST: CPT

## 2023-12-22 PROCEDURE — 36415 COLL VENOUS BLD VENIPUNCTURE: CPT

## 2023-12-22 NOTE — TELEPHONE ENCOUNTER
Called and spoke to Tracy Adams about right ectopic pregnancy status post methotrexate administration. Patient had methotrexate administered on 12/16/23. Her beta hCG at that time was 1653 . She was discharged home with repeat beta hCGs ordered for days 4 and 7 and strict ED precautions. Patients beta hCG on day 4 was 2021, and on day 7 was 1728, which represents a 14.5% decrease. Given that this did not meet the 15% decrease, it was recommended that the patient come back to the ED for a repeat dose of methotrexate at this time. Patient verbalized understanding and stated she will present to the ED tomorrow AM for her second dose of methotrexate. I instructed the patient that I would call back next week to follow up. ED precautions given.    Patient reports that she had significant pain after the initial dose of methotrexate, but this has subsequently resolved and she is reporting no bleeding.        Dang Zamudio MD  Obstetrics and Gynecology - PGY1

## 2023-12-23 ENCOUNTER — HOSPITAL ENCOUNTER (EMERGENCY)
Facility: OTHER | Age: 33
Discharge: HOME | End: 2023-12-23
Attending: EMERGENCY MEDICINE

## 2023-12-23 VITALS
BODY MASS INDEX: 38.98 KG/M2 | WEIGHT: 220 LBS | OXYGEN SATURATION: 99 % | HEIGHT: 63 IN | TEMPERATURE: 98 F | HEART RATE: 62 BPM | SYSTOLIC BLOOD PRESSURE: 112 MMHG | DIASTOLIC BLOOD PRESSURE: 62 MMHG | RESPIRATION RATE: 18 BRPM

## 2023-12-23 DIAGNOSIS — O00.90 ECTOPIC PREGNANCY, UNSPECIFIED LOCATION, UNSPECIFIED WHETHER INTRAUTERINE PREGNANCY PRESENT: Primary | ICD-10-CM

## 2023-12-23 PROCEDURE — 99284 EMERGENCY DEPT VISIT MOD MDM: CPT

## 2023-12-23 PROCEDURE — 63600175 PHARM REV CODE 636 W HCPCS: Performed by: EMERGENCY MEDICINE

## 2023-12-23 PROCEDURE — 96401 CHEMO ANTI-NEOPL SQ/IM: CPT | Performed by: EMERGENCY MEDICINE

## 2023-12-23 RX ORDER — METHOTREXATE 25 MG/ML
50 INJECTION INTRA-ARTERIAL; INTRAMUSCULAR; INTRATHECAL; INTRAVENOUS ONCE
Status: COMPLETED | OUTPATIENT
Start: 2023-12-23 | End: 2023-12-23

## 2023-12-23 RX ADMIN — METHOTREXATE 105 MG: 25 SOLUTION INTRA-ARTERIAL; INTRAMUSCULAR; INTRATHECAL; INTRAVENOUS at 09:12

## 2023-12-23 NOTE — Clinical Note
"Tracy Nunez" Angie was seen and treated in our emergency department on 12/23/2023.  She may return to work on 12/24/2023.       If you have any questions or concerns, please don't hesitate to call.      MARY Mccloud LPN"

## 2023-12-23 NOTE — ED PROVIDER NOTES
"     Source of History:  The patient    Chief complaint:  Ectopic Pregnancy (Pt recently being treated for ectopic pregnancy and was told to come to ED for methotrexate shot. She has no complaints at this time.)      HPI:  Tracy Adams is a 33 y.o. female  here for her 2nd dose of methotrexate.  Was diagnosed 1 week ago with an ectopic pregnancy approximately 5 weeks.  Received initial dose of methotrexate at that time as well as Depo-Provera shot.  She denies any symptoms.    This is the extent to the patients complaints today here in the emergency department.    ROS:   See HPI    Review of patient's allergies indicates:  No Known Allergies    PMH:  As per HPI and below:  Past Medical History:   Diagnosis Date    Depression     Scoliosis      No past surgical history on file.    Social History     Tobacco Use    Smoking status: Every Day     Current packs/day: 1.00     Types: Cigarettes   Substance Use Topics    Alcohol use: Yes     Comment: sometimes    Drug use: Yes     Types: Marijuana       Physical Exam:    /62 (BP Location: Left arm, Patient Position: Sitting)   Pulse 62   Temp 98.2 °F (36.8 °C) (Oral)   Resp 18   Ht 5' 3" (1.6 m)   Wt 99.8 kg (220 lb)   SpO2 99%   BMI 38.97 kg/m²   Nursing note and vital signs reviewed.  Constitutional: No acute distress.  Nontoxic  Eyes: No conjunctival injection. Extraocular muscles intact.  Musculoskeletal: Good range of motion all joints.  No deformities.  Neck supple.  No meningismus. Neurovascularly intact.        Summary of Medical Records:      MDM/ Differential Dx:   33-year-old female with recent diagnosis of ectopic pregnancy.  Here for 2nd dose of methotrexate.  Asymptomatic.  No further workup indicated.    ED Course as of 12/23/23 1005   Sat Dec 23, 2023   0900 Spoke with Dr. Moscoso with Gynecology.  Instructions to give same dose of IM methotrexate of 105 mg.  She stated the patient already has outpatient arrangements and just continue to " follow those. []   8029 I spoke with the pharmacist who will start mixing the drug for administration. [SM]   1004 Meds administered.      No further workup is indicated in the emergency department today.  I updated pt regarding results and I counseled pt regarding supportive care measures.  Diagnosis and treatment plan explained to patient. I have answered all questions and the patient is satisfied with the plan of care. Patient discharged home in stable condition.  [SM]      ED Course User Index  [] Andrews Fisher,                Diagnostic Impression:    1. Ectopic pregnancy, unspecified location, unspecified whether intrauterine pregnancy present         ED Disposition Condition    Discharge Stable            ED Prescriptions    None       Follow-up Information    None          Andrews Fisher,   12/23/23 5374

## 2023-12-26 ENCOUNTER — LAB VISIT (OUTPATIENT)
Dept: LAB | Facility: OTHER | Age: 33
End: 2023-12-26

## 2023-12-26 ENCOUNTER — TELEPHONE (OUTPATIENT)
Dept: OBSTETRICS AND GYNECOLOGY | Facility: HOSPITAL | Age: 33
End: 2023-12-26

## 2023-12-26 DIAGNOSIS — O36.80X0 PREGNANCY OF UNKNOWN ANATOMIC LOCATION: Primary | ICD-10-CM

## 2023-12-26 DIAGNOSIS — O36.80X0 PREGNANCY OF UNKNOWN ANATOMIC LOCATION: ICD-10-CM

## 2023-12-26 LAB — HCG INTACT+B SERPL-ACNC: 1132 MIU/ML

## 2023-12-26 PROCEDURE — 84702 CHORIONIC GONADOTROPIN TEST: CPT

## 2023-12-26 PROCEDURE — 36415 COLL VENOUS BLD VENIPUNCTURE: CPT

## 2023-12-26 NOTE — TELEPHONE ENCOUNTER
Called and spoke to Tracy Adams about weekly beta hcg blood draws  about right ectopic pregnancy status post methotrexate administration x2. Patient had methotrexate administered on 12/16/23 and again on 12/23/23. Her initial beta hCG was 1653. Patients beta hCG on day 4 was 2021, and on day 7 was 1728, which represents a 14.5% decrease. Patient then received her second dose of methotrexate on 12/16/23. Patient was called today to have her beta hCG repeated, representing day 11.    Patient states that she will present to the outpatient lab today for repeat beta hCG, which has been ordered    I instructed the patient that I would call back next week to follow up. ED precautions given.    Dang Zamudio MD  Obstetrics and Gynecology - PGY1

## 2023-12-28 ENCOUNTER — TELEPHONE (OUTPATIENT)
Dept: OBSTETRICS AND GYNECOLOGY | Facility: HOSPITAL | Age: 33
End: 2023-12-28

## 2023-12-28 DIAGNOSIS — O00.90 ECTOPIC PREGNANCY, UNSPECIFIED LOCATION, UNSPECIFIED WHETHER INTRAUTERINE PREGNANCY PRESENT: Primary | ICD-10-CM

## 2023-12-28 NOTE — TELEPHONE ENCOUNTER
Called and spoke to Tracy Adams about right ectopic pregnancy status post methotrexate administration x2. Patient had methotrexate administered on 12/16/23 and again on 12/23/23. Her initial beta hCG was 1653. Patients beta hCG on day 4 was 2021, and on day 7 was 1728, which represents a 14.5% decrease. Patient then received her second dose of methotrexate on 12/16/23. Patient was called today to have her beta hCG repeated, representing day 11. Beta hCG level on day 11 was 1132, representing a 34.5% decrease which is appropriate treatment. Will initiate weekly beta hCG levels until less than 5.     Patient states that she will present to the outpatient lab next week for repeat beta hCG, which has been ordered     I instructed the patient that I would call back next week to follow up. ED precautions given.     Dang Zamudio MD  Obstetrics and Gynecology - PGY1

## 2024-01-04 ENCOUNTER — LAB VISIT (OUTPATIENT)
Dept: LAB | Facility: OTHER | Age: 34
End: 2024-01-04

## 2024-01-04 DIAGNOSIS — O00.90 ECTOPIC PREGNANCY, UNSPECIFIED LOCATION, UNSPECIFIED WHETHER INTRAUTERINE PREGNANCY PRESENT: ICD-10-CM

## 2024-01-04 LAB — HCG INTACT+B SERPL-ACNC: 232 MIU/ML

## 2024-01-04 PROCEDURE — 84702 CHORIONIC GONADOTROPIN TEST: CPT

## 2024-01-04 PROCEDURE — 36415 COLL VENOUS BLD VENIPUNCTURE: CPT

## 2024-01-05 ENCOUNTER — HOSPITAL ENCOUNTER (EMERGENCY)
Facility: OTHER | Age: 34
Discharge: HOME | End: 2024-01-06
Attending: EMERGENCY MEDICINE

## 2024-01-05 VITALS
BODY MASS INDEX: 38.98 KG/M2 | OXYGEN SATURATION: 100 % | DIASTOLIC BLOOD PRESSURE: 68 MMHG | RESPIRATION RATE: 16 BRPM | HEART RATE: 104 BPM | WEIGHT: 220 LBS | HEIGHT: 63 IN | SYSTOLIC BLOOD PRESSURE: 131 MMHG | TEMPERATURE: 98 F

## 2024-01-05 DIAGNOSIS — O00.101 RIGHT TUBAL PREGNANCY WITHOUT INTRAUTERINE PREGNANCY: Primary | ICD-10-CM

## 2024-01-05 PROCEDURE — 99282 EMERGENCY DEPT VISIT SF MDM: CPT

## 2024-01-06 NOTE — ED PROVIDER NOTES
"     Source of History:  Patient, old chart    Chief complaint:  Vaginal Bleeding (Was told by PCP to f/u with ED for repeat Ultrasound for ectopic pregnancy . Patient was given injection 12/16 for and ultrasound on 12/29 . )      HPI:  Tracy Adams is a 33 y.o. female  with recent diagnosis of tubal pregnancy, treated with 2 doses of methotrexate, presents today at the urging of her PCP, due to concern for findings on ultrasound from 12/29.  Patient also had beta-hCG drawn yesterday.  The patient reports that she passed a single clot yesterday, otherwise has had only spotting when wiping for the past few days.  Pain has completely resolved.  She is feeling well generally.  No other acute complaints.    This is the extent to the patients complaints today here in the emergency department.    ROS: As per HPI and below:      Review of patient's allergies indicates:  No Known Allergies    PMH:  As per HPI and below:  Past Medical History:   Diagnosis Date    Depression     Scoliosis      No past surgical history on file.    Social History     Tobacco Use    Smoking status: Every Day     Current packs/day: 1.00     Types: Cigarettes   Substance Use Topics    Alcohol use: Yes     Comment: sometimes    Drug use: Yes     Types: Marijuana       Physical Exam:    /68 (BP Location: Right arm, Patient Position: Sitting)   Pulse 104   Temp 97.8 °F (36.6 °C) (Oral)   Resp 16   Ht 5' 3" (1.6 m)   Wt 99.8 kg (220 lb)   SpO2 100%   BMI 38.97 kg/m²   General: No acute distress. Well developed. Well nourished.  Eyes: PERRL. EOM intact. no photophobia, no nystagmus  Conjunctivae - no pallor or icterus.   ENT: HEAD: Normal - atraumatic. Normal external ears. Normal nose.  No facial asymmetry. Mucous membranes - moist.  Neck: Neck supple. no meningismus. No cervical lymphadenopathy.  No JVD.  Abdomen:  No tenderness throughout lower abdomen.  No rebound or guarding.  No distention.  Musculoskeletal:  Normal " weight-bearing and gait No deformities. Normal ROM x4.   Integument: No acute skin rashes. No clubbing or cyanosis  Neurologic: No gross neurological deficits.   Psychiatric: Awake, alert.  Oriented x3.  Normal speech and mentation.    Labs that have been ordered have been independently reviewed and interpreted by myself.    I decided to obtain the patient's medical records.  Summary of Medical Records:  Down trending beta-hCG from 2000, now 232.  Reviewed ultrasound from May 29th which shows 1.5 cm area of complex fluid likely blood within the right tube in the location of the ectopic pregnancy, no free fluid.    MDM/ Differential Dx:    33 y.o. female who presents for evaluation due to concern for finding on recent ultrasound.  I did review these findings, they seem to me to be consistent with the recent diagnosis and treatment of ectopic pregnancy with methotrexate.  Her beta-hCG is down trending appropriately and she is pain-free.  I do not think patient needs repeat ultrasound at this time.  I did discuss the case with on-call OBGYN reviewed patient's chart, recent ultrasound and they are in agreement.  They note that they have her down in the beta book for weekly beta HCGs.  Patient is comfortable with this plan for continued close outpatient follow-up and trending of the HCG.  I discussed return precautions with her, and her significant other at bedside.  They feel comfortable with these instructions.  Stable for discharge                 Diagnostic Impression:    1. Right tubal pregnancy without intrauterine pregnancy         ED Disposition Condition    Discharge Stable            ED Prescriptions    None       Follow-up Information       Follow up With Specialties Details Why Contact Info    Dang Zamudio MD Obstetrics and Gynecology In 3 days The gyn clinic will reach out to you this coming week to schedule the next repeat beta-hCG (blood pregnancy test) [id number]  [facility]  [id number]  [phone]               Julian Villareal II, MD  01/06/24 0028       Julian Villareal II, MD  01/06/24 0030

## 2024-01-11 ENCOUNTER — TELEPHONE (OUTPATIENT)
Dept: OBSTETRICS AND GYNECOLOGY | Facility: HOSPITAL | Age: 34
End: 2024-01-11

## 2024-01-11 DIAGNOSIS — O00.90 ECTOPIC PREGNANCY, UNSPECIFIED LOCATION, UNSPECIFIED WHETHER INTRAUTERINE PREGNANCY PRESENT: Primary | ICD-10-CM

## 2024-01-11 NOTE — TELEPHONE ENCOUNTER
Called and left a message regarding beta hcg levels. Stated patient needed to continue to have beta hcg levels drawn weekly after appropriate treatment of ectopic pregnancy with methotrexate.     12/16: MTX #1, beta 1653  12/19: beta 2021  12/22: beta 1728  12//23: MTX #2  12/26: beta 1132  1/4: beta 232    Stated GYN team could be reached by calling the [facility]  and asking for GYN team. Orders placed.    Surekha Landaverde MD/MPH  OB/GYN PGY4

## 2024-01-16 ENCOUNTER — TELEPHONE (OUTPATIENT)
Dept: OBSTETRICS AND GYNECOLOGY | Facility: HOSPITAL | Age: 34
End: 2024-01-16

## 2024-01-16 NOTE — TELEPHONE ENCOUNTER
Called and left a message regarding beta hcg levels. Stated patient needed to continue to have beta hcg levels drawn weekly after appropriate treatment of ectopic pregnancy with methotrexate.      12/16: MTX #1, beta 1653  12/19: beta 2021  12/22: beta 1728  12/23: MTX #2  12/26: beta 1132  1/4: beta 232     Stated GYN team could be reached by calling the [facility]  and asking for GYN team. Orders already in place.     Surekha Landaverde MD/MPH  OB/GYN PGY4

## 2024-01-22 ENCOUNTER — TELEPHONE (OUTPATIENT)
Dept: OBSTETRICS AND GYNECOLOGY | Facility: HOSPITAL | Age: 34
End: 2024-01-22

## 2024-01-22 NOTE — TELEPHONE ENCOUNTER
Called and left a message regarding beta hcg levels. Stated patient needed to continue to have beta hcg levels drawn weekly after appropriate treatment of ectopic pregnancy with methotrexate.      12/16: MTX #1, beta 1653  12/19: beta 2021  12/22: beta 1728  12/23: MTX #2  12/26: beta 1132  1/4: beta 232  1/11: beta 13     Stated GYN team could be reached by calling the [facility]  and asking for GYN team. Orders already in place.      Kay Woodruff MD PGY1  Obstetrics and Gynecology

## 2024-01-24 ENCOUNTER — PATIENT MESSAGE (OUTPATIENT)
Dept: OBSTETRICS AND GYNECOLOGY | Facility: HOSPITAL | Age: 34
End: 2024-01-24

## 2024-02-07 ENCOUNTER — OFFICE VISIT (OUTPATIENT)
Dept: OBSTETRICS AND GYNECOLOGY | Facility: CLINIC | Age: 34
End: 2024-02-07

## 2024-02-07 VITALS — BODY MASS INDEX: 39.14 KG/M2 | WEIGHT: 220.88 LBS | HEIGHT: 63 IN

## 2024-02-07 DIAGNOSIS — O00.90 ECTOPIC PREGNANCY, UNSPECIFIED LOCATION, UNSPECIFIED WHETHER INTRAUTERINE PREGNANCY PRESENT: Primary | ICD-10-CM

## 2024-02-07 PROCEDURE — 99213 OFFICE O/P EST LOW 20 MIN: CPT | Mod: S$PBB,,,

## 2024-02-07 PROCEDURE — 99999 PR PBB SHADOW E&M-EST. PATIENT-LVL III: CPT | Mod: PBBFAC,,,

## 2024-02-07 PROCEDURE — 99213 OFFICE O/P EST LOW 20 MIN: CPT | Mod: PBBFAC

## 2024-02-07 NOTE — PROGRESS NOTES
"Past medical, surgical, social, family, and obstetric histories; medications; prior records and results; and available outside records were reviewed and updated in the EMR.  Pertinent findings were noted below.    Reason for Visit   Follow-up    HPI   33 y.o. female     No LMP recorded.    Pt presents for f/u s/p ectopic pregnancy treated with two doses of methotrexate in 2023. Last hcg <1.2 on 24. Pt also received depo provera injection for birth control at same time of initial methotrexate dose.   Denies complaints today. Reports had some vaginal bleeding last month and pelvic cramping but that it resolved on its own  Denies fever, chills, N/V/D, abnormal vaginal discharge.  Pt would like to try to conceive     Contraception: DepoProvera  Pap: No result found, No recent documented pap  Mammogram: N/A    Exam   Ht 5' 3" (1.6 m)   Wt 100.2 kg (220 lb 14.4 oz)   Breastfeeding No   BMI 39.13 kg/m²     Physical Exam  Constitutional:       Appearance: Normal appearance. She is not ill-appearing.   Neurological:      Mental Status: She is alert and oriented to person, place, and time.   Psychiatric:         Mood and Affect: Mood normal.         Behavior: Behavior normal.       Assessment and Plan   Ectopic pregnancy, unspecified location, unspecified whether intrauterine pregnancy present      S/p ectopic pregnancy   -Asymptomatic  -HCG negative  -Counseled on safe sex practices  -Encouraged to avoid pregnancy s/p mtx for at least 3 months, pt understanding  -Encouraged to start PNV when trying to conceive    Sheeba Mishra MD  OBGYN PGY-3      "

## 2024-10-02 ENCOUNTER — OFFICE VISIT (OUTPATIENT)
Dept: OBSTETRICS AND GYNECOLOGY | Facility: CLINIC | Age: 34
End: 2024-10-02
Payer: MEDICAID

## 2024-10-02 VITALS
BODY MASS INDEX: 36.75 KG/M2 | SYSTOLIC BLOOD PRESSURE: 130 MMHG | WEIGHT: 207.44 LBS | HEIGHT: 63 IN | DIASTOLIC BLOOD PRESSURE: 82 MMHG

## 2024-10-02 DIAGNOSIS — N91.1 SECONDARY AMENORRHEA: Primary | ICD-10-CM

## 2024-10-02 PROBLEM — O36.80X0 PREGNANCY OF UNKNOWN ANATOMIC LOCATION: Status: RESOLVED | Noted: 2019-12-28 | Resolved: 2024-10-02

## 2024-10-02 LAB
B-HCG UR QL: NEGATIVE
CTP QC/QA: YES

## 2024-10-02 PROCEDURE — 99212 OFFICE O/P EST SF 10 MIN: CPT | Mod: PBBFAC | Performed by: STUDENT IN AN ORGANIZED HEALTH CARE EDUCATION/TRAINING PROGRAM

## 2024-10-02 PROCEDURE — 3079F DIAST BP 80-89 MM HG: CPT | Mod: CPTII,,, | Performed by: STUDENT IN AN ORGANIZED HEALTH CARE EDUCATION/TRAINING PROGRAM

## 2024-10-02 PROCEDURE — 3075F SYST BP GE 130 - 139MM HG: CPT | Mod: CPTII,,, | Performed by: STUDENT IN AN ORGANIZED HEALTH CARE EDUCATION/TRAINING PROGRAM

## 2024-10-02 PROCEDURE — 99214 OFFICE O/P EST MOD 30 MIN: CPT | Mod: S$PBB,,, | Performed by: STUDENT IN AN ORGANIZED HEALTH CARE EDUCATION/TRAINING PROGRAM

## 2024-10-02 PROCEDURE — 3008F BODY MASS INDEX DOCD: CPT | Mod: CPTII,,, | Performed by: STUDENT IN AN ORGANIZED HEALTH CARE EDUCATION/TRAINING PROGRAM

## 2024-10-02 PROCEDURE — 99999 PR PBB SHADOW E&M-EST. PATIENT-LVL II: CPT | Mod: PBBFAC,,, | Performed by: STUDENT IN AN ORGANIZED HEALTH CARE EDUCATION/TRAINING PROGRAM

## 2024-10-02 PROCEDURE — 99999PBSHW POCT URINE PREGNANCY: Mod: PBBFAC,,,

## 2024-10-02 PROCEDURE — 81025 URINE PREGNANCY TEST: CPT | Mod: PBBFAC | Performed by: STUDENT IN AN ORGANIZED HEALTH CARE EDUCATION/TRAINING PROGRAM

## 2024-10-02 RX ORDER — MEDROXYPROGESTERONE ACETATE 10 MG/1
10 TABLET ORAL DAILY
Qty: 10 TABLET | Refills: 0 | Status: SHIPPED | OUTPATIENT
Start: 2024-10-02 | End: 2025-10-02

## 2024-10-02 NOTE — PROGRESS NOTES
HPI:  Tracy Adams is a 34 y.o.  here for fertility testing/secondary amenorrhea.   - 2024 of this year she received MTX for PUL/presumed ectopic  - no period since then  - prior to Dec 2023 she had normal monthly periods  - she did receive depo provera x 1 when she got the MTX    - 2020 she had SAB, did not need medication or procedure    Menarche 16 y/o, previous monthly 5 day periods prior to Dec 2023    Had labs with PCP  - A1c 5.1%  - free T 1.5 (wnl)  - total T 19 (wnl)  - progesterone < 0.5  - prolactin 7.0  - estradiol 49  - insulin 9.9  - FSH 5.8  - LH 2.7  - TSH 1.6  - FT4 1.2    ROS:  GENERAL: Feeling well overall. Denies fever or chills.   SKIN: Denies rash or lesions.   HEAD: Denies head injury or headache.   NODES: Denies enlarged lymph nodes.   CHEST: Denies chest pain or shortness of breath.   CARDIOVASCULAR: Denies palpitations or left sided chest pain.   ABDOMEN: No abdominal pain, constipation, diarrhea, nausea, vomiting or rectal bleeding.   URINARY: No dysuria, hematuria, or burning on urination.  REPRODUCTIVE: See HPI.   BREASTS: Denies pain, lumps, or nipple discharge.   HEMATOLOGIC: No easy bruisability or excessive bleeding.   MUSCULOSKELETAL: Denies joint pain or swelling.   NEUROLOGIC: Denies syncope or weakness.   PSYCHIATRIC: Denies depression, anxiety or mood swings.    PE:   APPEARANCE: Well nourished, well developed female in no acute distress.  RESPIRATORY: normal respiratory effort  EXTREMITIES: normal ROM, no edema bilaterally  NEURO: alert and oriented, normal gait  PSYCH: normal mood and affect      Diagnosis:  1. Secondary amenorrhea        Plan:     Orders Placed This Encounter    POCT urine pregnancy    medroxyPROGESTERone (PROVERA) 10 MG tablet     Likely prolonged effect from depo provera that we will have to wait out. Offered provera trial but unlikely to be as useful.  It can take 12 months or longer for menses to return after the birth control shot.      RTC Spring 2025 if no period    Es Rehman MD  Obstetrics & Gynecology   [facility]

## 2025-02-19 ENCOUNTER — OFFICE VISIT (OUTPATIENT)
Dept: OBSTETRICS AND GYNECOLOGY | Facility: CLINIC | Age: 35
End: 2025-02-19
Payer: MEDICAID

## 2025-02-19 VITALS
DIASTOLIC BLOOD PRESSURE: 72 MMHG | BODY MASS INDEX: 34.38 KG/M2 | WEIGHT: 194 LBS | HEIGHT: 63 IN | SYSTOLIC BLOOD PRESSURE: 98 MMHG

## 2025-02-19 DIAGNOSIS — N93.9 ABNORMAL UTERINE BLEEDING (AUB): Primary | ICD-10-CM

## 2025-02-19 LAB
B-HCG UR QL: NEGATIVE
CTP QC/QA: YES

## 2025-02-19 PROCEDURE — 81025 URINE PREGNANCY TEST: CPT | Mod: PBBFAC | Performed by: STUDENT IN AN ORGANIZED HEALTH CARE EDUCATION/TRAINING PROGRAM

## 2025-02-19 PROCEDURE — 99212 OFFICE O/P EST SF 10 MIN: CPT | Mod: PBBFAC | Performed by: STUDENT IN AN ORGANIZED HEALTH CARE EDUCATION/TRAINING PROGRAM

## 2025-02-19 RX ORDER — TRANEXAMIC ACID 650 MG/1
1300 TABLET ORAL 3 TIMES DAILY
Qty: 30 TABLET | Refills: 2 | Status: SHIPPED | OUTPATIENT
Start: 2025-02-19 | End: 2025-02-24

## 2025-02-19 NOTE — PROGRESS NOTES
Interval HPI:   Tracy is a 34 y.o.  seen today for follow-up.   Had prolonged absent menses following MTX/depo provera in  of last year.   Menses returned in November. Had normal period November, December, January and beginning this month February. She has not been tracking her period with a Calendar.   After her period ended, about a week later (this week) she started having heavy bleeding.       Initial HPI 10/2/24:  Tracy Adams is a 34 y.o.  here for fertility testing/secondary amenorrhea.   - 2024 of this year she received MTX for PUL/presumed ectopic  - no period since then  - prior to Dec 2023 she had normal monthly periods  - she did receive depo provera x 1 when she got the MTX    - 2020 she had SAB, did not need medication or procedure    Menarche 14 y/o, previous monthly 5 day periods prior to Dec 2023    Had labs with PCP  - A1c 5.1%  - free T 1.5 (wnl)  - total T 19 (wnl)  - progesterone < 0.5  - prolactin 7.0  - estradiol 49  - insulin 9.9  - FSH 5.8  - LH 2.7  - TSH 1.6  - FT4 1.2    ROS:  GENERAL: Feeling well overall. Denies fever or chills.   SKIN: Denies rash or lesions.   HEAD: Denies head injury or headache.   NODES: Denies enlarged lymph nodes.   CHEST: Denies chest pain or shortness of breath.   CARDIOVASCULAR: Denies palpitations or left sided chest pain.   ABDOMEN: No abdominal pain, constipation, diarrhea, nausea, vomiting or rectal bleeding.   URINARY: No dysuria, hematuria, or burning on urination.  REPRODUCTIVE: See HPI.   BREASTS: Denies pain, lumps, or nipple discharge.   HEMATOLOGIC: No easy bruisability or excessive bleeding.   MUSCULOSKELETAL: Denies joint pain or swelling.   NEUROLOGIC: Denies syncope or weakness.   PSYCHIATRIC: Denies depression, anxiety or mood swings.    PE:   APPEARANCE: Well nourished, well developed female in no acute distress.  RESPIRATORY: normal respiratory effort  EXTREMITIES: normal ROM, no edema bilaterally  NEURO:  alert and oriented, normal gait  PSYCH: normal mood and affect      Diagnosis:  1. Abnormal uterine bleeding (AUB)        Plan:     Orders Placed This Encounter    US Pelvis Comp with Transvag NON-OB (xpd    POCT urine pregnancy    tranexamic acid (LYSTEDA) 650 mg tablet     Likely isolated event, however we discussed bleeding that would warrant closer attention.   I asked her to track the first day of each period in her calendar  She does not want to use any hormonal meds as she is trying to conceive    Es Rehman MD  Obstetrics & Gynecology   [facility]     I spent a total of 34 minutes on the day of the visit.  This includes face to face time and non-face to face time preparing to see the patient (eg, review of tests), obtaining and/or reviewing separately obtained history, documenting clinical information in the electronic or other health record, independently interpreting results and communicating results to the patient/family/caregiver, or care coordinator.

## 2025-02-26 ENCOUNTER — HOSPITAL ENCOUNTER (OUTPATIENT)
Dept: RADIOLOGY | Facility: OTHER | Age: 35
Discharge: HOME | End: 2025-02-26
Attending: STUDENT IN AN ORGANIZED HEALTH CARE EDUCATION/TRAINING PROGRAM
Payer: MEDICAID

## 2025-02-26 DIAGNOSIS — N93.9 ABNORMAL UTERINE BLEEDING (AUB): ICD-10-CM

## 2025-02-26 PROCEDURE — 76830 TRANSVAGINAL US NON-OB: CPT | Mod: 26,,, | Performed by: RADIOLOGY

## 2025-02-26 PROCEDURE — 76856 US EXAM PELVIC COMPLETE: CPT | Mod: TC

## 2025-02-26 PROCEDURE — 76856 US EXAM PELVIC COMPLETE: CPT | Mod: 26,,, | Performed by: RADIOLOGY

## 2025-02-27 ENCOUNTER — RESULTS FOLLOW-UP (OUTPATIENT)
Dept: OBSTETRICS AND GYNECOLOGY | Facility: CLINIC | Age: 35
End: 2025-02-27
Payer: MEDICAID

## 2025-02-27 DIAGNOSIS — N93.9 ABNORMAL UTERINE BLEEDING (AUB): Primary | ICD-10-CM

## 2025-02-27 NOTE — TELEPHONE ENCOUNTER
Spoke with patient about US results. Recommend HSG, patient would like to proceed. Discussed the possibility of OOP cost. Electronic order signed and sent to [employer].